# Patient Record
Sex: FEMALE | Race: BLACK OR AFRICAN AMERICAN | Employment: OTHER | URBAN - METROPOLITAN AREA
[De-identification: names, ages, dates, MRNs, and addresses within clinical notes are randomized per-mention and may not be internally consistent; named-entity substitution may affect disease eponyms.]

---

## 2017-07-12 ENCOUNTER — OFFICE VISIT (OUTPATIENT)
Dept: SURGERY | Age: 62
End: 2017-07-12

## 2017-07-12 VITALS
WEIGHT: 150.8 LBS | HEART RATE: 77 BPM | BODY MASS INDEX: 28.47 KG/M2 | HEIGHT: 61 IN | SYSTOLIC BLOOD PRESSURE: 120 MMHG | DIASTOLIC BLOOD PRESSURE: 66 MMHG | TEMPERATURE: 97.3 F

## 2017-07-12 DIAGNOSIS — Z90.711 HISTORY OF HYSTERECTOMY, SUPRACERVICAL: ICD-10-CM

## 2017-07-12 DIAGNOSIS — Z12.31 ENCOUNTER FOR SCREENING MAMMOGRAM FOR BREAST CANCER: ICD-10-CM

## 2017-07-12 DIAGNOSIS — Z01.419 ENCOUNTER FOR ROUTINE GYNECOLOGICAL EXAMINATION WITH PAPANICOLAOU SMEAR OF CERVIX: Primary | ICD-10-CM

## 2017-07-12 DIAGNOSIS — N95.1 SYMPTOMATIC MENOPAUSAL OR FEMALE CLIMACTERIC STATES: ICD-10-CM

## 2017-07-12 NOTE — MR AVS SNAPSHOT
Visit Information Date & Time Provider Department Dept. Phone Encounter #  
 7/12/2017  3:30 PM Vani Su, [address] [facility] Surgical [address] [id number] Follow-up Instructions Return in about 1 year (around 7/12/2018), or if symptoms worsen or fail to improve. Upcoming Health Maintenance Date Due Hepatitis C Screening 1955 DTaP/Tdap/Td series (1 - Tdap) 10/20/1978 FOBT Q 1 YEAR AGE 50-75 10/20/2007 PAP AKA CERVICAL CYTOLOGY 7/3/2017 INFLUENZA AGE 9 TO ADULT 8/1/2017 BREAST CANCER SCRN MAMMOGRAM 12/5/2018 Allergies as of 7/12/2017  Review Complete On: 7/12/2017 By: Vani Su MD  
 No Known Allergies Current Immunizations  Never Reviewed No immunizations on file. Not reviewed this visit You Were Diagnosed With   
  
 Codes Comments Encounter for routine gynecological examination with Papanicolaou smear of cervix    -  Primary ICD-10-CM: M72.002 ICD-9-CM: V72.31, V76.2 History of hysterectomy, supracervical     ICD-10-CM: Z90.711 ICD-9-CM: V88.02 Symptomatic menopausal or female climacteric states     ICD-10-CM: N95.1 ICD-9-CM: 627.2 Encounter for screening mammogram for breast cancer     ICD-10-CM: Z12.31 
ICD-9-CM: V76.12 Vitals BP Pulse Temp Height(growth percentile) Weight(growth percentile) LMP  
 120/66 77 97.3 °F (36.3 °C) (Oral) 5' 1\" (1.549 m) 150 lb 12.8 oz (68.4 kg) 01/12/2004 BMI OB Status Smoking Status 28.49 kg/m2 Hysterectomy Never Smoker Vitals History BMI and BSA Data Body Mass Index Body Surface Area  
 28.49 kg/m 2 1.72 m 2 Preferred Pharmacy Pharmacy Name Phone [facility] [address] , [address] [facility] [phone] Your Updated Medication List  
  
   
This list is accurate as of: 7/12/17  4:10 PM.  Always use your most recent med list. amLODIPine 5 mg tablet Commonly known as:  Jacque Pineda Take 5 mg by mouth daily. atorvastatin 10 mg tablet Commonly known as:  LIPITOR Take 1 Tab by mouth daily. SARA ASPIRIN PO Take  by mouth.  
  
 multivitamin tablet Commonly known as:  ONE A DAY Take 1 Tab by mouth daily. We Performed the Following PAP, LB, RFX HPV SELBW([id number]) [id number] CPT(R)] Follow-up Instructions Return in about 1 year (around 7/12/2018), or if symptoms worsen or fail to improve. To-Do List   
 07/12/2017 Imaging:  BOB MAMMO BI SCREENING INCL CAD Introducing \[facility]\"" & HEALTH SERVICES! Dear Barron Jenkins: Thank you for requesting a remocean account. Our records indicate that you already have an active remocean account. You can access your account anytime at [email or web address] Did you know that you can access your hospital and ER discharge instructions at any time in remocean? You can also review all of your test results from your hospital stay or ER visit. Additional Information If you have questions, please visit the Frequently Asked Questions section of the remocean website at [email or web address] Remember, remocean is NOT to be used for urgent needs. For medical emergencies, dial 911. Now available from your iPhone and Android! Please provide this summary of care documentation to your next provider. Your primary care clinician is listed as Darnell Cotton. If you have any questions after today's visit, please call [phone].

## 2017-07-12 NOTE — PROGRESS NOTES
SUBJECTIVE: Mac Gallardo is a 63 y.o. female who presents with desire for annual well woman exam. Patient's last menstrual period was 2004. No Known Allergies      Past Medical History:   Diagnosis Date    Anemia NEC     Essential hypertension     Sickle cell trait (HCC)        Past Surgical History:   Procedure Laterality Date    HX  SECTION      x1    HX DILATION AND CURETTAGE      HX HYSTERECTOMY  2004    [facility]    HX TUBAL LIGATION         OB History      Para Term  AB Living    3 2   1     SAB TAB Ectopic Molar Multiple Live Births                   Family History   Problem Relation Age of Onset    Diabetes Mother     Hypertension Mother     Elevated Lipids Mother     Heart Disease Father 79     CHF    Hypertension Father     Psychiatric Disorder Father      Alzheimer's dz    Hypertension Sister     Hypertension Sister        Social History     Social History    Marital status:      Spouse name: N/A    Number of children: N/A    Years of education: N/A     Occupational History    Not on file. Social History Main Topics    Smoking status: Never Smoker    Smokeless tobacco: Never Used    Alcohol use Yes      Comment: rarely    Drug use: No    Sexual activity: Yes     Partners: Male     Birth control/ protection: Surgical     Other Topics Concern    Not on file     Social History Narrative       Current Outpatient Prescriptions   Medication Sig Dispense Refill    multivitamin (ONE A DAY) tablet Take 1 Tab by mouth daily.  SARA ASPIRIN PO Take  by mouth.  atorvastatin (LIPITOR) 10 mg tablet Take 1 Tab by mouth daily. 90 Tab 3    amLODIPine (NORVASC) 5 mg tablet Take 5 mg by mouth daily. Review of Systems:   Constitutional: No weight change, chills or fever, anorexia, weakness or sleep disturbance . Cardiovascular: No chest pain, shortness of breath, or palpitations .   Respiratory: No cough, shortness of breath, hemoptysis, or orthopnea . Neurologic: No syncope, headaches or seizures . Hematologic: No easy bruising or unusual bleeding . Psychiatric: No insomnia, confusion, depression, or anxiety . GI:No nausea and vomiting, diarrhea or constipation  . : See HPI . Musculoskeletal: No joint pain or muscle pain . Endocrine: No polydipsia, polyuria, cold intolerance, excessive fatigue, or sleep disturbance . Integumentary: No breast pain, lumps, nipple discharge, or axillary lumps . Objective:     Visit Vitals    Ht 5' 1\" (1.549 m)    Wt 150 lb 12.8 oz (68.4 kg)    LMP 01/12/2004    BMI 28.49 kg/m2       General:  alert, cooperative, no distress, appears stated age   Skin:  no rash or abnormalities   Eyes: negative   Mouth: MMM no lesions   Lymph Nodes:  Cervical, supraclavicular, and axillary nodes normal.   Breast Exam: normal appearance, no masses or tenderness    Lungs:  clear to auscultation bilaterally   Heart:  regular rate and rhythm, S1, S2 normal, no murmur, click, rub or gallop   Abdomen: soft, non-tender. Bowel sounds normal. No masses,  no organomegaly   Back:  Costovertebral angle tenderness absent   Genitourinary: External genitalia: normal general appearance  Urinary system: urethral meatus normal  Vaginal: normal mucosa without prolapse or lesions  Cervix: normal appearance  Adnexa: normal bimanual exam and non palpable  Uterus: removed surgically. Extremities:  extremities normal, atraumatic, no cyanosis or edema   Neurologic:  sensation grossly intact. Psychiatric:  non focal     ASSESSMENT:      ICD-10-CM ICD-9-CM    1. Encounter for routine gynecological examination with Papanicolaou smear of cervix Z01.419 V72.31 PAP, LB, RFX HPV [facility]([id number])     V76.2    2. History of hysterectomy, supracervical Z90.711 V88.02    3. Symptomatic menopausal or female climacteric states N95.1 627.2    4.  Encounter for screening mammogram for breast cancer Z12.31 V76.12 BOB MAMMO BI SCREENING INCL CAD        Follow-up Disposition:  Return in about 1 year (around 7/12/2018), or if symptoms worsen or fail to improve.

## 2017-07-14 LAB
CYTOLOGIST CVX/VAG CYTO: NORMAL
DX ICD CODE: NORMAL
LABCORP, 190119: NORMAL
Lab: NORMAL
Lab: NORMAL
OTHER STN SPEC: NORMAL
PATH REPORT.FINAL DX SPEC: NORMAL
STAT OF ADQ CVX/VAG CYTO-IMP: NORMAL

## 2017-12-08 ENCOUNTER — HOSPITAL ENCOUNTER (OUTPATIENT)
Dept: MAMMOGRAPHY | Age: 62
Discharge: HOME | End: 2017-12-08
Attending: OBSTETRICS & GYNECOLOGY
Payer: COMMERCIAL

## 2017-12-08 DIAGNOSIS — Z12.31 ENCOUNTER FOR SCREENING MAMMOGRAM FOR BREAST CANCER: ICD-10-CM

## 2017-12-08 PROCEDURE — 77067 SCR MAMMO BI INCL CAD: CPT

## 2018-07-13 ENCOUNTER — OFFICE VISIT (OUTPATIENT)
Dept: SURGERY | Age: 63
End: 2018-07-13

## 2018-07-13 VITALS
HEART RATE: 75 BPM | DIASTOLIC BLOOD PRESSURE: 68 MMHG | OXYGEN SATURATION: 98 % | HEIGHT: 61 IN | TEMPERATURE: 96.8 F | SYSTOLIC BLOOD PRESSURE: 130 MMHG | WEIGHT: 151 LBS | BODY MASS INDEX: 28.51 KG/M2

## 2018-07-13 DIAGNOSIS — N95.1 SYMPTOMATIC MENOPAUSAL OR FEMALE CLIMACTERIC STATES: ICD-10-CM

## 2018-07-13 DIAGNOSIS — Z01.419 WOMEN'S ANNUAL ROUTINE GYNECOLOGICAL EXAMINATION: Primary | ICD-10-CM

## 2018-07-13 DIAGNOSIS — Z90.711 HISTORY OF HYSTERECTOMY, SUPRACERVICAL: ICD-10-CM

## 2018-07-13 NOTE — PROGRESS NOTES
SUBJECTIVE: Aamir Pérez is a 63 y.o. female who presents with desire for annual well woman exam. Patient's last menstrual period was 2004. No Known Allergies      Past Medical History:   Diagnosis Date    Anemia NEC     Essential hypertension     Sickle cell trait (HCC)        Past Surgical History:   Procedure Laterality Date    HX  SECTION      x1    HX DILATION AND CURETTAGE      HX HYSTERECTOMY  2004    [address] [facility] [address]    HX TUBAL LIGATION         OB History      Para Term  AB Living    3 2   1     SAB TAB Ectopic Molar Multiple Live Births                   Family History   Problem Relation Age of Onset    Diabetes Mother     Hypertension Mother     Elevated Lipids Mother     Heart Disease Father 79     CHF    Hypertension Father     Psychiatric Disorder Father      Alzheimer's dz    Hypertension Sister     Hypertension Sister        Social History     Social History    Marital status:      Spouse name: N/A    Number of children: N/A    Years of education: N/A     Occupational History    Not on file. Social History Main Topics    Smoking status: Never Smoker    Smokeless tobacco: Never Used    Alcohol use Yes      Comment: rarely    Drug use: No    Sexual activity: Yes     Partners: Male     Birth control/ protection: Surgical     Other Topics Concern    Not on file     Social History Narrative       Current Outpatient Prescriptions   Medication Sig Dispense Refill    multivitamin (ONE A DAY) tablet Take 1 Tab by mouth daily.  SARA ASPIRIN PO Take  by mouth.  atorvastatin (LIPITOR) 10 mg tablet Take 1 Tab by mouth daily. 90 Tab 3    amLODIPine (NORVASC) 5 mg tablet Take 5 mg by mouth daily. Review of Systems:   Constitutional: No weight change, chills or fever, anorexia, weakness or sleep disturbance . Cardiovascular: No chest pain, shortness of breath, or palpitations .   Respiratory: No cough, shortness of breath, hemoptysis, or orthopnea . Neurologic: No syncope, headaches or seizures . Hematologic: No easy bruising or unusual bleeding . Psychiatric: No insomnia, confusion, depression, or anxiety . GI:No nausea and vomiting, diarrhea or constipation  . : See HPI . Musculoskeletal: No joint pain or muscle pain . Endocrine: No polydipsia, polyuria, cold intolerance, excessive fatigue, or sleep disturbance . Integumentary: No breast pain, lumps, nipple discharge, or axillary lumps . Objective:     Visit Vitals    /68    Pulse 75    Temp 96.8 °F (36 °C) (Temporal)    Ht 5' 1\" (1.549 m)    Wt 151 lb (68.5 kg)    LMP 01/12/2004    SpO2 98%    BMI 28.53 kg/m2       General:  alert, cooperative, no distress, appears stated age   Skin:  no rash or abnormalities   Eyes: negative   Mouth: MMM no lesions   Lymph Nodes:  Cervical, supraclavicular, and axillary nodes normal.   Breast Exam: normal appearance, no masses or tenderness    Lungs:  clear to auscultation bilaterally   Heart:  regular rate and rhythm, S1, S2 normal, no murmur, click, rub or gallop   Abdomen: soft, non-tender. Bowel sounds normal. No masses,  no organomegaly   Back:  Costovertebral angle tenderness absent   Genitourinary: External genitalia: normal general appearance  Urinary system: urethral meatus normal  Vaginal: normal mucosa without prolapse or lesions  Cervix: normal appearance  Adnexa: normal bimanual exam and non palpable  Uterus: removed surgically. Extremities:  extremities normal, atraumatic, no cyanosis or edema   Neurologic:  sensation grossly intact. Psychiatric:  non focal     ASSESSMENT:      ICD-10-CM ICD-9-CM    1. Women's annual routine gynecological examination Z01.419 V72.31    2. History of hysterectomy, supracervical Z90.711 V88.02    3.  Symptomatic menopausal or female climacteric states N95.1 627.2         Follow-up Disposition:  Return in about 1 year (around 7/13/2019), or if symptoms worsen or fail to improve.

## 2018-07-13 NOTE — MR AVS SNAPSHOT
[address] [address] [phone] Patient: Avery Rick MRN: [id number] [id number] Visit Information Date & Time Provider Department Dept. Phone Encounter #  
 7/13/2018  2:15 PM Leonila Mata [facility] Surgical [address] [id number] Follow-up Instructions Return in about 1 year (around 7/13/2019), or if symptoms worsen or fail to improve. Upcoming Health Maintenance Date Due Hepatitis C Screening 1955 DTaP/Tdap/Td series (1 - Tdap) 10/20/1978 FOBT Q 1 YEAR AGE 50-75 10/20/2007 ZOSTER VACCINE AGE 60> 8/20/2017 Influenza Age 5 to Adult 8/1/2018 BREAST CANCER SCRN MAMMOGRAM 12/8/2019 PAP AKA CERVICAL CYTOLOGY 7/12/2020 Allergies as of 7/13/2018  Review Complete On: 7/13/2018 By: Leonila Mata MD  
 No Known Allergies Current Immunizations  Never Reviewed No immunizations on file. Not reviewed this visit You Were Diagnosed With   
  
 Codes Comments Women's annual routine gynecological examination    -  Primary ICD-10-CM: H54.643 ICD-9-CM: V72.31 History of hysterectomy, supracervical     ICD-10-CM: Z90.711 ICD-9-CM: V88.02 Symptomatic menopausal or female climacteric states     ICD-10-CM: N95.1 ICD-9-CM: 627.2 Vitals BP Pulse Temp Height(growth percentile) Weight(growth percentile) LMP  
 130/68 75 96.8 °F (36 °C) (Temporal) 5' 1\" (1.549 m) 151 lb (68.5 kg) 01/12/2004 SpO2 BMI OB Status Smoking Status 98% 28.53 kg/m2 Hysterectomy Never Smoker Vitals History BMI and BSA Data Body Mass Index Body Surface Area 28.53 kg/m 2 1.72 m 2 Preferred Pharmacy Pharmacy Name Phone [facility] [address] [place] [address], [address] [place] [address] [phone] Your Updated Medication List  
  
   
This list is accurate as of 7/13/18  3:13 PM.  Always use your most recent med list.  
  
 amLODIPine 5 mg tablet Commonly known as:  Flores Del Take 5 mg by mouth daily. atorvastatin 10 mg tablet Commonly known as:  LIPITOR Take 1 Tab by mouth daily. SARA ASPIRIN PO Take  by mouth.  
  
 multivitamin tablet Commonly known as:  ONE A DAY Take 1 Tab by mouth daily. Follow-up Instructions Return in about 1 year (around 7/13/2019), or if symptoms worsen or fail to improve. Introducing [facility]! Dear Kerline Croft: Thank you for requesting a [email or web address] account. Our records indicate that you already have an active [email or web address] account. You can access your account anytime at [email or web address] Did you know that you can access your hospital and ER discharge instructions at any time in [email or web address]? You can also review all of your test results from your hospital stay or ER visit. Additional Information If you have questions, please visit the Frequently Asked Questions section of the [email or web address] website at [email or web address] Remember, [email or web address] is NOT to be used for urgent needs. For medical emergencies, dial 911. Now available from your iPhone and Android! Please provide this summary of care documentation to your next provider. Your primary care clinician is listed as Joby Marrero. If you have any questions after today's visit, please call [phone].

## 2018-12-10 ENCOUNTER — HOSPITAL ENCOUNTER (OUTPATIENT)
Dept: MAMMOGRAPHY | Age: 63
Discharge: HOME | End: 2018-12-10
Attending: INTERNAL MEDICINE
Payer: COMMERCIAL

## 2018-12-10 DIAGNOSIS — Z12.39 SCREENING BREAST EXAMINATION: ICD-10-CM

## 2018-12-10 PROCEDURE — 77067 SCR MAMMO BI INCL CAD: CPT

## 2020-01-02 ENCOUNTER — HOSPITAL ENCOUNTER (OUTPATIENT)
Dept: MAMMOGRAPHY | Age: 65
Discharge: HOME | End: 2020-01-02
Attending: INTERNAL MEDICINE
Payer: COMMERCIAL

## 2020-01-02 DIAGNOSIS — Z12.31 VISIT FOR SCREENING MAMMOGRAM: ICD-10-CM

## 2020-01-02 PROCEDURE — 77067 SCR MAMMO BI INCL CAD: CPT

## 2020-12-09 ENCOUNTER — TRANSCRIBE ORDER (OUTPATIENT)
Dept: SCHEDULING | Age: 65
End: 2020-12-09

## 2020-12-09 DIAGNOSIS — Z12.31 VISIT FOR SCREENING MAMMOGRAM: Primary | ICD-10-CM

## 2021-02-04 ENCOUNTER — HOSPITAL ENCOUNTER (OUTPATIENT)
Dept: MAMMOGRAPHY | Age: 66
Discharge: HOME | End: 2021-02-04
Attending: INTERNAL MEDICINE
Payer: COMMERCIAL

## 2021-02-04 DIAGNOSIS — Z12.31 VISIT FOR SCREENING MAMMOGRAM: ICD-10-CM

## 2021-02-04 PROCEDURE — 77063 BREAST TOMOSYNTHESIS BI: CPT

## 2022-01-12 ENCOUNTER — TRANSCRIBE ORDER (OUTPATIENT)
Dept: SCHEDULING | Age: 67
End: 2022-01-12

## 2022-01-12 DIAGNOSIS — Z12.31 SCREENING MAMMOGRAM FOR HIGH-RISK PATIENT: Primary | ICD-10-CM

## 2023-05-10 RX ORDER — AMLODIPINE BESYLATE 5 MG/1
5 TABLET ORAL DAILY
COMMUNITY
Start: 2015-01-14

## 2023-05-10 RX ORDER — ATORVASTATIN CALCIUM 10 MG/1
TABLET, FILM COATED ORAL DAILY
COMMUNITY
Start: 2015-04-24

## 2024-09-24 ENCOUNTER — APPOINTMENT (OUTPATIENT)
Facility: HOSPITAL | Age: 69
End: 2024-09-24
Payer: MEDICARE

## 2024-09-24 ENCOUNTER — APPOINTMENT (OUTPATIENT)
Facility: HOSPITAL | Age: 69
DRG: 494 | End: 2024-09-24
Payer: MEDICARE

## 2024-09-24 ENCOUNTER — HOSPITAL ENCOUNTER (EMERGENCY)
Facility: HOSPITAL | Age: 69
Discharge: ANOTHER HOSPITAL | End: 2024-09-24
Payer: MEDICARE

## 2024-09-24 ENCOUNTER — HOSPITAL ENCOUNTER (INPATIENT)
Facility: HOSPITAL | Age: 69
LOS: 5 days | Discharge: HOME | DRG: 494 | End: 2024-09-29
Attending: EMERGENCY MEDICINE | Admitting: ORTHOPAEDIC SURGERY
Payer: MEDICARE

## 2024-09-24 VITALS
TEMPERATURE: 97.9 F | RESPIRATION RATE: 18 BRPM | WEIGHT: 150 LBS | HEART RATE: 59 BPM | HEIGHT: 61 IN | BODY MASS INDEX: 28.32 KG/M2 | OXYGEN SATURATION: 95 % | DIASTOLIC BLOOD PRESSURE: 58 MMHG | SYSTOLIC BLOOD PRESSURE: 123 MMHG

## 2024-09-24 DIAGNOSIS — S82.892A BILATERAL ANKLE FRACTURES, CLOSED, INITIAL ENCOUNTER: Primary | ICD-10-CM
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
## 2024-09-24 DIAGNOSIS — S82.841A BIMALLEOLAR FRACTURE, RIGHT, CLOSED, INITIAL ENCOUNTER: Primary | ICD-10-CM

## 2024-09-24 DIAGNOSIS — S82.891A CLOSED FRACTURE OF BOTH ANKLES, INITIAL ENCOUNTER: ICD-10-CM

## 2024-09-24 DIAGNOSIS — S82.52XA CLOSED DISPLACED FRACTURE OF MEDIAL MALLEOLUS OF LEFT TIBIA, INITIAL ENCOUNTER: ICD-10-CM

## 2024-09-24 DIAGNOSIS — S82.892A CLOSED FRACTURE OF BOTH ANKLES, INITIAL ENCOUNTER: ICD-10-CM

## 2024-09-24 DIAGNOSIS — S82.891A BILATERAL ANKLE FRACTURES, CLOSED, INITIAL ENCOUNTER: Primary | ICD-10-CM

## 2024-09-24 LAB
ALBUMIN SERPL-MCNC: 3.7 G/DL (ref 3.5–5)
ALBUMIN/GLOB SERPL: 0.9 (ref 1.1–2.2)
ALP SERPL-CCNC: 90 U/L (ref 45–117)
ALT SERPL-CCNC: 16 U/L (ref 12–78)
ANION GAP SERPL CALC-SCNC: 9 MMOL/L (ref 2–12)
AST SERPL-CCNC: 18 U/L (ref 15–37)
BASOPHILS # BLD: 0.1 K/UL (ref 0–0.1)
BASOPHILS NFR BLD: 1 % (ref 0–1)
BILIRUB SERPL-MCNC: 0.2 MG/DL (ref 0.2–1)
BUN SERPL-MCNC: 20 MG/DL (ref 6–20)
BUN/CREAT SERPL: 15 (ref 12–20)
CALCIUM SERPL-MCNC: 9.8 MG/DL (ref 8.5–10.1)
CHLORIDE SERPL-SCNC: 104 MMOL/L (ref 97–108)
CO2 SERPL-SCNC: 30 MMOL/L (ref 21–32)
CREAT SERPL-MCNC: 1.31 MG/DL (ref 0.55–1.02)
DIFFERENTIAL METHOD BLD: ABNORMAL
EOSINOPHIL # BLD: 0.1 K/UL (ref 0–0.4)
EOSINOPHIL NFR BLD: 1 % (ref 0–7)
ERYTHROCYTE [DISTWIDTH] IN BLOOD BY AUTOMATED COUNT: 13.9 % (ref 11.5–14.5)
GLOBULIN SER CALC-MCNC: 4.2 G/DL (ref 2–4)
GLUCOSE SERPL-MCNC: 123 MG/DL (ref 65–100)
HCT VFR BLD AUTO: 36.3 % (ref 35–47)
HGB BLD-MCNC: 12.2 G/DL (ref 11.5–16)
IMM GRANULOCYTES # BLD AUTO: 0.1 K/UL (ref 0–0.04)
IMM GRANULOCYTES NFR BLD AUTO: 1 % (ref 0–0.5)
LYMPHOCYTES # BLD: 2.8 K/UL (ref 0.8–3.5)
LYMPHOCYTES NFR BLD: 19 % (ref 12–49)
MCH RBC QN AUTO: 25.3 PG (ref 26–34)
MCHC RBC AUTO-ENTMCNC: 33.6 G/DL (ref 30–36.5)
MCV RBC AUTO: 75.3 FL (ref 80–99)
MONOCYTES # BLD: 0.8 K/UL (ref 0–1)
MONOCYTES NFR BLD: 5 % (ref 5–13)
NEUTS SEG # BLD: 10.5 K/UL (ref 1.8–8)
NEUTS SEG NFR BLD: 73 % (ref 32–75)
NRBC # BLD: 0 K/UL (ref 0–0.01)
NRBC BLD-RTO: 0 PER 100 WBC
PLATELET # BLD AUTO: 280 K/UL (ref 150–400)
PMV BLD AUTO: 11.3 FL (ref 8.9–12.9)
POTASSIUM SERPL-SCNC: 4 MMOL/L (ref 3.5–5.1)
PROT SERPL-MCNC: 7.9 G/DL (ref 6.4–8.2)
RBC # BLD AUTO: 4.82 M/UL (ref 3.8–5.2)
SODIUM SERPL-SCNC: 143 MMOL/L (ref 136–145)
TROPONIN I SERPL HS-MCNC: 17 NG/L (ref 0–51)
TROPONIN I SERPL HS-MCNC: 18 NG/L (ref 0–51)
WBC # BLD AUTO: 14.3 K/UL (ref 3.6–11)

## 2024-09-24 PROCEDURE — 85025 COMPLETE CBC W/AUTO DIFF WBC: CPT

## 2024-09-24 PROCEDURE — 73610 X-RAY EXAM OF ANKLE: CPT

## 2024-09-24 PROCEDURE — 6360000002 HC RX W HCPCS: Performed by: PHYSICIAN ASSISTANT

## 2024-09-24 PROCEDURE — 80053 COMPREHEN METABOLIC PANEL: CPT

## 2024-09-24 PROCEDURE — 36415 COLL VENOUS BLD VENIPUNCTURE: CPT

## 2024-09-24 PROCEDURE — 84484 ASSAY OF TROPONIN QUANT: CPT

## 2024-09-24 PROCEDURE — 2580000003 HC RX 258: Performed by: PHYSICIAN ASSISTANT

## 2024-09-24 PROCEDURE — 1100000000 HC RM PRIVATE

## 2024-09-24 PROCEDURE — 6370000000 HC RX 637 (ALT 250 FOR IP): Performed by: PHYSICIAN ASSISTANT

## 2024-09-24 PROCEDURE — 99285 EMERGENCY DEPT VISIT HI MDM: CPT

## 2024-09-24 PROCEDURE — 93005 ELECTROCARDIOGRAM TRACING: CPT | Performed by: PHYSICIAN ASSISTANT

## 2024-09-24 PROCEDURE — 96374 THER/PROPH/DIAG INJ IV PUSH: CPT

## 2024-09-24 PROCEDURE — 71045 X-RAY EXAM CHEST 1 VIEW: CPT

## 2024-09-24 PROCEDURE — APPNB15 APP NON BILLABLE TIME 0-15 MINS: Performed by: PHYSICIAN ASSISTANT

## 2024-09-24 PROCEDURE — 2580000003 HC RX 258

## 2024-09-24 PROCEDURE — 6370000000 HC RX 637 (ALT 250 FOR IP)

## 2024-09-24 RX ORDER — POTASSIUM CHLORIDE 7.45 MG/ML
10 INJECTION INTRAVENOUS PRN
Status: DISCONTINUED | OUTPATIENT
Start: 2024-09-24 | End: 2024-09-25

## 2024-09-24 RX ORDER — ACETAMINOPHEN 500 MG
500 TABLET ORAL EVERY 6 HOURS
Status: DISCONTINUED | OUTPATIENT
Start: 2024-09-24 | End: 2024-09-29 | Stop reason: HOSPADM

## 2024-09-24 RX ORDER — OXYCODONE HYDROCHLORIDE 5 MG/1
5 TABLET ORAL EVERY 4 HOURS PRN
Status: DISCONTINUED | OUTPATIENT
Start: 2024-09-24 | End: 2024-09-29 | Stop reason: HOSPADM

## 2024-09-24 RX ORDER — SODIUM CHLORIDE 9 MG/ML
INJECTION, SOLUTION INTRAVENOUS CONTINUOUS
Status: DISCONTINUED | OUTPATIENT
Start: 2024-09-24 | End: 2024-09-26

## 2024-09-24 RX ORDER — POLYETHYLENE GLYCOL 3350 17 G/17G
17 POWDER, FOR SOLUTION ORAL DAILY PRN
Status: DISCONTINUED | OUTPATIENT
Start: 2024-09-24 | End: 2024-09-29 | Stop reason: HOSPADM

## 2024-09-24 RX ORDER — OXYCODONE HYDROCHLORIDE 5 MG/1
5 TABLET ORAL
Status: COMPLETED | OUTPATIENT
Start: 2024-09-24 | End: 2024-09-24

## 2024-09-24 RX ORDER — POTASSIUM CHLORIDE 1500 MG/1
40 TABLET, EXTENDED RELEASE ORAL PRN
Status: DISCONTINUED | OUTPATIENT
Start: 2024-09-24 | End: 2024-09-25

## 2024-09-24 RX ORDER — 0.9 % SODIUM CHLORIDE 0.9 %
1000 INTRAVENOUS SOLUTION INTRAVENOUS ONCE
Status: COMPLETED | OUTPATIENT
Start: 2024-09-24 | End: 2024-09-24

## 2024-09-24 RX ORDER — INSULIN LISPRO 100 [IU]/ML
0-4 INJECTION, SOLUTION INTRAVENOUS; SUBCUTANEOUS NIGHTLY
Status: DISCONTINUED | OUTPATIENT
Start: 2024-09-24 | End: 2024-09-25

## 2024-09-24 RX ORDER — SODIUM CHLORIDE 0.9 % (FLUSH) 0.9 %
5-40 SYRINGE (ML) INJECTION EVERY 12 HOURS SCHEDULED
Status: DISCONTINUED | OUTPATIENT
Start: 2024-09-24 | End: 2024-09-29 | Stop reason: HOSPADM

## 2024-09-24 RX ORDER — SODIUM CHLORIDE 9 MG/ML
INJECTION, SOLUTION INTRAVENOUS PRN
Status: DISCONTINUED | OUTPATIENT
Start: 2024-09-24 | End: 2024-09-29 | Stop reason: HOSPADM

## 2024-09-24 RX ORDER — INSULIN LISPRO 100 [IU]/ML
0-8 INJECTION, SOLUTION INTRAVENOUS; SUBCUTANEOUS
Status: DISCONTINUED | OUTPATIENT
Start: 2024-09-25 | End: 2024-09-25

## 2024-09-24 RX ORDER — MAGNESIUM SULFATE IN WATER 40 MG/ML
2000 INJECTION, SOLUTION INTRAVENOUS PRN
Status: DISCONTINUED | OUTPATIENT
Start: 2024-09-24 | End: 2024-09-29 | Stop reason: HOSPADM

## 2024-09-24 RX ORDER — ONDANSETRON 2 MG/ML
4 INJECTION INTRAMUSCULAR; INTRAVENOUS EVERY 6 HOURS PRN
Status: DISCONTINUED | OUTPATIENT
Start: 2024-09-24 | End: 2024-09-29 | Stop reason: HOSPADM

## 2024-09-24 RX ORDER — MORPHINE SULFATE 2 MG/ML
2 INJECTION, SOLUTION INTRAMUSCULAR; INTRAVENOUS
Status: DISCONTINUED | OUTPATIENT
Start: 2024-09-24 | End: 2024-09-29 | Stop reason: HOSPADM

## 2024-09-24 RX ORDER — ENOXAPARIN SODIUM 100 MG/ML
30 INJECTION SUBCUTANEOUS ONCE
Status: COMPLETED | OUTPATIENT
Start: 2024-09-24 | End: 2024-09-25

## 2024-09-24 RX ORDER — ATORVASTATIN CALCIUM 20 MG/1
20 TABLET, FILM COATED ORAL DAILY
Status: DISCONTINUED | OUTPATIENT
Start: 2024-09-25 | End: 2024-09-25

## 2024-09-24 RX ORDER — VALSARTAN 160 MG/1
160 TABLET ORAL DAILY
Status: DISCONTINUED | OUTPATIENT
Start: 2024-09-25 | End: 2024-09-29 | Stop reason: HOSPADM

## 2024-09-24 RX ORDER — ONDANSETRON 4 MG/1
4 TABLET, ORALLY DISINTEGRATING ORAL EVERY 8 HOURS PRN
Status: DISCONTINUED | OUTPATIENT
Start: 2024-09-24 | End: 2024-09-29 | Stop reason: HOSPADM

## 2024-09-24 RX ORDER — FAMOTIDINE 20 MG/1
20 TABLET, FILM COATED ORAL 2 TIMES DAILY
Status: DISCONTINUED | OUTPATIENT
Start: 2024-09-24 | End: 2024-09-26

## 2024-09-24 RX ORDER — SODIUM CHLORIDE, SODIUM LACTATE, POTASSIUM CHLORIDE, CALCIUM CHLORIDE 600; 310; 30; 20 MG/100ML; MG/100ML; MG/100ML; MG/100ML
INJECTION, SOLUTION INTRAVENOUS ONCE
Status: DISCONTINUED | OUTPATIENT
Start: 2024-09-24 | End: 2024-09-29 | Stop reason: HOSPADM

## 2024-09-24 RX ORDER — SODIUM CHLORIDE 0.9 % (FLUSH) 0.9 %
5-40 SYRINGE (ML) INJECTION PRN
Status: DISCONTINUED | OUTPATIENT
Start: 2024-09-24 | End: 2024-09-29 | Stop reason: HOSPADM

## 2024-09-24 RX ADMIN — OXYCODONE HYDROCHLORIDE 5 MG: 5 TABLET ORAL at 16:49

## 2024-09-24 RX ADMIN — SODIUM CHLORIDE, PRESERVATIVE FREE 5 ML: 5 INJECTION INTRAVENOUS at 21:31

## 2024-09-24 RX ADMIN — SODIUM CHLORIDE 1000 ML: 9 INJECTION, SOLUTION INTRAVENOUS at 17:24

## 2024-09-24 RX ADMIN — ACETAMINOPHEN 500 MG: 500 TABLET ORAL at 21:25

## 2024-09-24 RX ADMIN — MORPHINE SULFATE 2 MG: 2 INJECTION, SOLUTION INTRAMUSCULAR; INTRAVENOUS at 21:25

## 2024-09-24 ASSESSMENT — PAIN - FUNCTIONAL ASSESSMENT
PAIN_FUNCTIONAL_ASSESSMENT: 0-10
PAIN_FUNCTIONAL_ASSESSMENT: 0-10

## 2024-09-24 ASSESSMENT — PAIN DESCRIPTION - ORIENTATION
ORIENTATION: LEFT;RIGHT
ORIENTATION: RIGHT;LEFT

## 2024-09-24 ASSESSMENT — PAIN DESCRIPTION - LOCATION
LOCATION: ANKLE

## 2024-09-24 ASSESSMENT — PAIN SCALES - GENERAL
PAINLEVEL_OUTOF10: 6
PAINLEVEL_OUTOF10: 6
PAINLEVEL_OUTOF10: 7

## 2024-09-25 ENCOUNTER — ANESTHESIA EVENT (OUTPATIENT)
Facility: HOSPITAL | Age: 69
End: 2024-09-25
Payer: MEDICARE

## 2024-09-25 ENCOUNTER — APPOINTMENT (OUTPATIENT)
Facility: HOSPITAL | Age: 69
DRG: 494 | End: 2024-09-25
Payer: MEDICARE

## 2024-09-25 ENCOUNTER — ANESTHESIA (OUTPATIENT)
Facility: HOSPITAL | Age: 69
End: 2024-09-25
Payer: MEDICARE

## 2024-09-25 LAB
ABO + RH BLD: NORMAL
ALBUMIN SERPL-MCNC: 3.3 G/DL (ref 3.5–5)
ALBUMIN/GLOB SERPL: 0.8 (ref 1.1–2.2)
ALP SERPL-CCNC: 84 U/L (ref 45–117)
ALT SERPL-CCNC: 16 U/L (ref 12–78)
ANION GAP SERPL CALC-SCNC: 3 MMOL/L (ref 2–12)
AST SERPL-CCNC: 16 U/L (ref 15–37)
BASOPHILS # BLD: 0 K/UL (ref 0–0.1)
BASOPHILS NFR BLD: 0 % (ref 0–1)
BILIRUB SERPL-MCNC: 0.5 MG/DL (ref 0.2–1)
BLOOD GROUP ANTIBODIES SERPL: NORMAL
BUN SERPL-MCNC: 18 MG/DL (ref 6–20)
BUN/CREAT SERPL: 17 (ref 12–20)
CALCIUM SERPL-MCNC: 9.2 MG/DL (ref 8.5–10.1)
CHLORIDE SERPL-SCNC: 109 MMOL/L (ref 97–108)
CO2 SERPL-SCNC: 28 MMOL/L (ref 21–32)
COMMENT:: NORMAL
CREAT SERPL-MCNC: 1.05 MG/DL (ref 0.55–1.02)
DIFFERENTIAL METHOD BLD: ABNORMAL
EKG ATRIAL RATE: 56 BPM
EKG ATRIAL RATE: 60 BPM
EKG DIAGNOSIS: NORMAL
EKG DIAGNOSIS: NORMAL
EKG P AXIS: 46 DEGREES
EKG P AXIS: 51 DEGREES
EKG P-R INTERVAL: 140 MS
EKG P-R INTERVAL: 144 MS
EKG Q-T INTERVAL: 418 MS
EKG Q-T INTERVAL: 422 MS
EKG QRS DURATION: 72 MS
EKG QRS DURATION: 74 MS
EKG QTC CALCULATION (BAZETT): 407 MS
EKG QTC CALCULATION (BAZETT): 418 MS
EKG R AXIS: 27 DEGREES
EKG R AXIS: 9 DEGREES
EKG T AXIS: 36 DEGREES
EKG T AXIS: 49 DEGREES
EKG VENTRICULAR RATE: 56 BPM
EKG VENTRICULAR RATE: 60 BPM
EOSINOPHIL # BLD: 0.1 K/UL (ref 0–0.4)
EOSINOPHIL NFR BLD: 1 % (ref 0–7)
ERYTHROCYTE [DISTWIDTH] IN BLOOD BY AUTOMATED COUNT: 14.1 % (ref 11.5–14.5)
GLOBULIN SER CALC-MCNC: 3.9 G/DL (ref 2–4)
GLUCOSE BLD STRIP.AUTO-MCNC: 104 MG/DL (ref 65–117)
GLUCOSE BLD STRIP.AUTO-MCNC: 175 MG/DL (ref 65–117)
GLUCOSE BLD STRIP.AUTO-MCNC: 82 MG/DL (ref 65–117)
GLUCOSE BLD STRIP.AUTO-MCNC: 89 MG/DL (ref 65–117)
GLUCOSE SERPL-MCNC: 111 MG/DL (ref 65–100)
HCT VFR BLD AUTO: 34.8 % (ref 35–47)
HGB BLD-MCNC: 11.3 G/DL (ref 11.5–16)
IMM GRANULOCYTES # BLD AUTO: 0 K/UL (ref 0–0.04)
IMM GRANULOCYTES NFR BLD AUTO: 0 % (ref 0–0.5)
LYMPHOCYTES # BLD: 2.9 K/UL (ref 0.8–3.5)
LYMPHOCYTES NFR BLD: 29 % (ref 12–49)
MCH RBC QN AUTO: 24.9 PG (ref 26–34)
MCHC RBC AUTO-ENTMCNC: 32.5 G/DL (ref 30–36.5)
MCV RBC AUTO: 76.8 FL (ref 80–99)
MONOCYTES # BLD: 0.7 K/UL (ref 0–1)
MONOCYTES NFR BLD: 7 % (ref 5–13)
NEUTS SEG # BLD: 6.3 K/UL (ref 1.8–8)
NEUTS SEG NFR BLD: 63 % (ref 32–75)
NRBC # BLD: 0 K/UL (ref 0–0.01)
NRBC BLD-RTO: 0 PER 100 WBC
PLATELET # BLD AUTO: 266 K/UL (ref 150–400)
PMV BLD AUTO: 11 FL (ref 8.9–12.9)
POTASSIUM SERPL-SCNC: 3.7 MMOL/L (ref 3.5–5.1)
PROT SERPL-MCNC: 7.2 G/DL (ref 6.4–8.2)
RBC # BLD AUTO: 4.53 M/UL (ref 3.8–5.2)
SERVICE CMNT-IMP: ABNORMAL
SERVICE CMNT-IMP: NORMAL
SODIUM SERPL-SCNC: 140 MMOL/L (ref 136–145)
SPECIMEN EXP DATE BLD: NORMAL
SPECIMEN HOLD: NORMAL
WBC # BLD AUTO: 10 K/UL (ref 3.6–11)

## 2024-09-25 PROCEDURE — 2709999900 HC NON-CHARGEABLE SUPPLY: Performed by: ORTHOPAEDIC SURGERY

## 2024-09-25 PROCEDURE — 85025 COMPLETE CBC W/AUTO DIFF WBC: CPT

## 2024-09-25 PROCEDURE — 36415 COLL VENOUS BLD VENIPUNCTURE: CPT

## 2024-09-25 PROCEDURE — 6360000002 HC RX W HCPCS: Performed by: STUDENT IN AN ORGANIZED HEALTH CARE EDUCATION/TRAINING PROGRAM

## 2024-09-25 PROCEDURE — 2580000003 HC RX 258: Performed by: NURSE ANESTHETIST, CERTIFIED REGISTERED

## 2024-09-25 PROCEDURE — 83036 HEMOGLOBIN GLYCOSYLATED A1C: CPT

## 2024-09-25 PROCEDURE — C1769 GUIDE WIRE: HCPCS | Performed by: ORTHOPAEDIC SURGERY

## 2024-09-25 PROCEDURE — 3600000014 HC SURGERY LEVEL 4 ADDTL 15MIN: Performed by: ORTHOPAEDIC SURGERY

## 2024-09-25 PROCEDURE — 0QSG04Z REPOSITION RIGHT TIBIA WITH INTERNAL FIXATION DEVICE, OPEN APPROACH: ICD-10-PCS | Performed by: ORTHOPAEDIC SURGERY

## 2024-09-25 PROCEDURE — C1713 ANCHOR/SCREW BN/BN,TIS/BN: HCPCS | Performed by: ORTHOPAEDIC SURGERY

## 2024-09-25 PROCEDURE — 2720000010 HC SURG SUPPLY STERILE: Performed by: ORTHOPAEDIC SURGERY

## 2024-09-25 PROCEDURE — 86901 BLOOD TYPING SEROLOGIC RH(D): CPT

## 2024-09-25 PROCEDURE — 86900 BLOOD TYPING SEROLOGIC ABO: CPT

## 2024-09-25 PROCEDURE — 6370000000 HC RX 637 (ALT 250 FOR IP): Performed by: ORTHOPAEDIC SURGERY

## 2024-09-25 PROCEDURE — 2580000003 HC RX 258: Performed by: PHYSICIAN ASSISTANT

## 2024-09-25 PROCEDURE — 82962 GLUCOSE BLOOD TEST: CPT

## 2024-09-25 PROCEDURE — 1100000000 HC RM PRIVATE

## 2024-09-25 PROCEDURE — 6360000002 HC RX W HCPCS: Performed by: PHYSICIAN ASSISTANT

## 2024-09-25 PROCEDURE — 3600000004 HC SURGERY LEVEL 4 BASE: Performed by: ORTHOPAEDIC SURGERY

## 2024-09-25 PROCEDURE — 2500000003 HC RX 250 WO HCPCS: Performed by: NURSE ANESTHETIST, CERTIFIED REGISTERED

## 2024-09-25 PROCEDURE — 7100000001 HC PACU RECOVERY - ADDTL 15 MIN: Performed by: ORTHOPAEDIC SURGERY

## 2024-09-25 PROCEDURE — 7100000000 HC PACU RECOVERY - FIRST 15 MIN: Performed by: ORTHOPAEDIC SURGERY

## 2024-09-25 PROCEDURE — 0QSJ04Z REPOSITION RIGHT FIBULA WITH INTERNAL FIXATION DEVICE, OPEN APPROACH: ICD-10-PCS | Performed by: ORTHOPAEDIC SURGERY

## 2024-09-25 PROCEDURE — 93010 ELECTROCARDIOGRAM REPORT: CPT | Performed by: SPECIALIST

## 2024-09-25 PROCEDURE — 80053 COMPREHEN METABOLIC PANEL: CPT

## 2024-09-25 PROCEDURE — 86850 RBC ANTIBODY SCREEN: CPT

## 2024-09-25 PROCEDURE — 6360000002 HC RX W HCPCS: Performed by: NURSE ANESTHETIST, CERTIFIED REGISTERED

## 2024-09-25 PROCEDURE — 64445 NJX AA&/STRD SCIATIC NRV IMG: CPT | Performed by: STUDENT IN AN ORGANIZED HEALTH CARE EDUCATION/TRAINING PROGRAM

## 2024-09-25 PROCEDURE — 6370000000 HC RX 637 (ALT 250 FOR IP): Performed by: PHYSICIAN ASSISTANT

## 2024-09-25 PROCEDURE — 3700000001 HC ADD 15 MINUTES (ANESTHESIA): Performed by: ORTHOPAEDIC SURGERY

## 2024-09-25 PROCEDURE — 64447 NJX AA&/STRD FEMORAL NRV IMG: CPT | Performed by: STUDENT IN AN ORGANIZED HEALTH CARE EDUCATION/TRAINING PROGRAM

## 2024-09-25 PROCEDURE — 0QSH04Z REPOSITION LEFT TIBIA WITH INTERNAL FIXATION DEVICE, OPEN APPROACH: ICD-10-PCS | Performed by: ORTHOPAEDIC SURGERY

## 2024-09-25 PROCEDURE — 3700000000 HC ANESTHESIA ATTENDED CARE: Performed by: ORTHOPAEDIC SURGERY

## 2024-09-25 PROCEDURE — 2580000003 HC RX 258: Performed by: ORTHOPAEDIC SURGERY

## 2024-09-25 DEVICE — LOCKING SCREW
Type: IMPLANTABLE DEVICE | Site: ANKLE | Status: FUNCTIONAL
Brand: VARIAX

## 2024-09-25 DEVICE — BONE SCREW
Type: IMPLANTABLE DEVICE | Site: ANKLE | Status: FUNCTIONAL
Brand: VARIAX

## 2024-09-25 DEVICE — DISTAL LATERAL FIBULA PLATE, 4 HOLE
Type: IMPLANTABLE DEVICE | Site: ANKLE | Status: FUNCTIONAL
Brand: VARIAX

## 2024-09-25 DEVICE — CANNULATED SCREW
Type: IMPLANTABLE DEVICE | Site: ANKLE | Status: FUNCTIONAL
Brand: ASNIS

## 2024-09-25 DEVICE — BONE SCREW, FULLY THREADED,T10
Type: IMPLANTABLE DEVICE | Site: ANKLE | Status: FUNCTIONAL
Brand: VARIAX

## 2024-09-25 RX ORDER — INSULIN LISPRO 100 [IU]/ML
0-4 INJECTION, SOLUTION INTRAVENOUS; SUBCUTANEOUS NIGHTLY
Status: DISCONTINUED | OUTPATIENT
Start: 2024-09-25 | End: 2024-09-29 | Stop reason: HOSPADM

## 2024-09-25 RX ORDER — LIDOCAINE HYDROCHLORIDE 20 MG/ML
INJECTION, SOLUTION EPIDURAL; INFILTRATION; INTRACAUDAL; PERINEURAL
Status: DISCONTINUED | OUTPATIENT
Start: 2024-09-25 | End: 2024-09-25 | Stop reason: SDUPTHER

## 2024-09-25 RX ORDER — DEXAMETHASONE SODIUM PHOSPHATE 4 MG/ML
4 INJECTION, SOLUTION INTRA-ARTICULAR; INTRALESIONAL; INTRAMUSCULAR; INTRAVENOUS; SOFT TISSUE
Status: DISCONTINUED | OUTPATIENT
Start: 2024-09-25 | End: 2024-09-25 | Stop reason: HOSPADM

## 2024-09-25 RX ORDER — DEXAMETHASONE SODIUM PHOSPHATE 4 MG/ML
INJECTION, SOLUTION INTRA-ARTICULAR; INTRALESIONAL; INTRAMUSCULAR; INTRAVENOUS; SOFT TISSUE
Status: DISCONTINUED | OUTPATIENT
Start: 2024-09-25 | End: 2024-09-25 | Stop reason: SDUPTHER

## 2024-09-25 RX ORDER — NALOXONE HYDROCHLORIDE 0.4 MG/ML
INJECTION, SOLUTION INTRAMUSCULAR; INTRAVENOUS; SUBCUTANEOUS PRN
Status: DISCONTINUED | OUTPATIENT
Start: 2024-09-25 | End: 2024-09-25 | Stop reason: HOSPADM

## 2024-09-25 RX ORDER — ACETAMINOPHEN 325 MG/1
650 TABLET ORAL
Status: DISCONTINUED | OUTPATIENT
Start: 2024-09-25 | End: 2024-09-25 | Stop reason: HOSPADM

## 2024-09-25 RX ORDER — MIDAZOLAM HYDROCHLORIDE 1 MG/ML
INJECTION INTRAMUSCULAR; INTRAVENOUS
Status: DISCONTINUED | OUTPATIENT
Start: 2024-09-25 | End: 2024-09-25 | Stop reason: SDUPTHER

## 2024-09-25 RX ORDER — CEFAZOLIN SODIUM 1 G/3ML
INJECTION, POWDER, FOR SOLUTION INTRAMUSCULAR; INTRAVENOUS
Status: DISCONTINUED | OUTPATIENT
Start: 2024-09-25 | End: 2024-09-25 | Stop reason: SDUPTHER

## 2024-09-25 RX ORDER — DEXTROSE MONOHYDRATE 100 MG/ML
INJECTION, SOLUTION INTRAVENOUS CONTINUOUS PRN
Status: DISCONTINUED | OUTPATIENT
Start: 2024-09-25 | End: 2024-09-29 | Stop reason: HOSPADM

## 2024-09-25 RX ORDER — PHENYLEPHRINE HYDROCHLORIDE 10 MG/ML
INJECTION INTRAVENOUS
Status: DISCONTINUED | OUTPATIENT
Start: 2024-09-25 | End: 2024-09-25 | Stop reason: SDUPTHER

## 2024-09-25 RX ORDER — FENTANYL CITRATE 50 UG/ML
INJECTION, SOLUTION INTRAMUSCULAR; INTRAVENOUS
Status: DISCONTINUED | OUTPATIENT
Start: 2024-09-25 | End: 2024-09-25 | Stop reason: SDUPTHER

## 2024-09-25 RX ORDER — POTASSIUM CHLORIDE 750 MG/1
10 TABLET, EXTENDED RELEASE ORAL DAILY
Status: DISCONTINUED | OUTPATIENT
Start: 2024-09-25 | End: 2024-09-29 | Stop reason: HOSPADM

## 2024-09-25 RX ORDER — SODIUM CHLORIDE, SODIUM LACTATE, POTASSIUM CHLORIDE, CALCIUM CHLORIDE 600; 310; 30; 20 MG/100ML; MG/100ML; MG/100ML; MG/100ML
INJECTION, SOLUTION INTRAVENOUS
Status: DISCONTINUED | OUTPATIENT
Start: 2024-09-25 | End: 2024-09-25 | Stop reason: SDUPTHER

## 2024-09-25 RX ORDER — ATORVASTATIN CALCIUM 20 MG/1
20 TABLET, FILM COATED ORAL NIGHTLY
Status: DISCONTINUED | OUTPATIENT
Start: 2024-09-25 | End: 2024-09-29 | Stop reason: HOSPADM

## 2024-09-25 RX ORDER — GLUCAGON 1 MG/ML
1 KIT INJECTION PRN
Status: DISCONTINUED | OUTPATIENT
Start: 2024-09-25 | End: 2024-09-29 | Stop reason: HOSPADM

## 2024-09-25 RX ORDER — HYDROMORPHONE HYDROCHLORIDE 1 MG/ML
0.25 INJECTION, SOLUTION INTRAMUSCULAR; INTRAVENOUS; SUBCUTANEOUS EVERY 5 MIN PRN
Status: DISCONTINUED | OUTPATIENT
Start: 2024-09-25 | End: 2024-09-25 | Stop reason: HOSPADM

## 2024-09-25 RX ORDER — BUPIVACAINE HYDROCHLORIDE 2.5 MG/ML
INJECTION, SOLUTION EPIDURAL; INFILTRATION; INTRACAUDAL
Status: DISCONTINUED | OUTPATIENT
Start: 2024-09-25 | End: 2024-09-25 | Stop reason: SDUPTHER

## 2024-09-25 RX ORDER — INSULIN LISPRO 100 [IU]/ML
0-4 INJECTION, SOLUTION INTRAVENOUS; SUBCUTANEOUS
Status: DISCONTINUED | OUTPATIENT
Start: 2024-09-25 | End: 2024-09-29 | Stop reason: HOSPADM

## 2024-09-25 RX ORDER — SODIUM CHLORIDE 0.9 % (FLUSH) 0.9 %
5-40 SYRINGE (ML) INJECTION EVERY 12 HOURS SCHEDULED
Status: DISCONTINUED | OUTPATIENT
Start: 2024-09-25 | End: 2024-09-25 | Stop reason: HOSPADM

## 2024-09-25 RX ORDER — HYDRALAZINE HYDROCHLORIDE 20 MG/ML
10 INJECTION INTRAMUSCULAR; INTRAVENOUS
Status: DISCONTINUED | OUTPATIENT
Start: 2024-09-25 | End: 2024-09-25 | Stop reason: HOSPADM

## 2024-09-25 RX ORDER — SODIUM CHLORIDE 0.9 % (FLUSH) 0.9 %
5-40 SYRINGE (ML) INJECTION PRN
Status: DISCONTINUED | OUTPATIENT
Start: 2024-09-25 | End: 2024-09-25 | Stop reason: HOSPADM

## 2024-09-25 RX ORDER — FENTANYL CITRATE 50 UG/ML
25 INJECTION, SOLUTION INTRAMUSCULAR; INTRAVENOUS EVERY 5 MIN PRN
Status: DISCONTINUED | OUTPATIENT
Start: 2024-09-25 | End: 2024-09-25 | Stop reason: HOSPADM

## 2024-09-25 RX ORDER — ONDANSETRON 2 MG/ML
INJECTION INTRAMUSCULAR; INTRAVENOUS
Status: DISCONTINUED | OUTPATIENT
Start: 2024-09-25 | End: 2024-09-25 | Stop reason: SDUPTHER

## 2024-09-25 RX ORDER — SODIUM CHLORIDE 9 MG/ML
INJECTION, SOLUTION INTRAVENOUS PRN
Status: DISCONTINUED | OUTPATIENT
Start: 2024-09-25 | End: 2024-09-25 | Stop reason: HOSPADM

## 2024-09-25 RX ORDER — PROCHLORPERAZINE EDISYLATE 5 MG/ML
5 INJECTION INTRAMUSCULAR; INTRAVENOUS
Status: DISCONTINUED | OUTPATIENT
Start: 2024-09-25 | End: 2024-09-25 | Stop reason: HOSPADM

## 2024-09-25 RX ORDER — OXYCODONE HYDROCHLORIDE 5 MG/1
5 TABLET ORAL
Status: DISCONTINUED | OUTPATIENT
Start: 2024-09-25 | End: 2024-09-25 | Stop reason: HOSPADM

## 2024-09-25 RX ADMIN — ATORVASTATIN CALCIUM 20 MG: 20 TABLET, FILM COATED ORAL at 20:40

## 2024-09-25 RX ADMIN — SODIUM CHLORIDE, PRESERVATIVE FREE 10 ML: 5 INJECTION INTRAVENOUS at 10:48

## 2024-09-25 RX ADMIN — ONDANSETRON 4 MG: 2 INJECTION INTRAMUSCULAR; INTRAVENOUS at 15:55

## 2024-09-25 RX ADMIN — MORPHINE SULFATE 2 MG: 2 INJECTION, SOLUTION INTRAMUSCULAR; INTRAVENOUS at 02:24

## 2024-09-25 RX ADMIN — MIDAZOLAM HYDROCHLORIDE 2 MG: 1 INJECTION, SOLUTION INTRAMUSCULAR; INTRAVENOUS at 15:23

## 2024-09-25 RX ADMIN — LIDOCAINE HYDROCHLORIDE 100 MG: 20 INJECTION, SOLUTION EPIDURAL; INFILTRATION; INTRACAUDAL; PERINEURAL at 15:48

## 2024-09-25 RX ADMIN — DEXAMETHASONE SODIUM PHOSPHATE 8 MG: 4 INJECTION INTRA-ARTICULAR; INTRALESIONAL; INTRAMUSCULAR; INTRAVENOUS; SOFT TISSUE at 15:55

## 2024-09-25 RX ADMIN — BUPIVACAINE HYDROCHLORIDE 20 ML: 2.5 INJECTION, SOLUTION EPIDURAL; INFILTRATION; INTRACAUDAL at 15:34

## 2024-09-25 RX ADMIN — ACETAMINOPHEN 500 MG: 500 TABLET ORAL at 10:47

## 2024-09-25 RX ADMIN — PROPOFOL 40 MG: 10 INJECTION, EMULSION INTRAVENOUS at 15:23

## 2024-09-25 RX ADMIN — OXYCODONE 5 MG: 5 TABLET ORAL at 10:47

## 2024-09-25 RX ADMIN — PROPOFOL 150 MG: 10 INJECTION, EMULSION INTRAVENOUS at 15:48

## 2024-09-25 RX ADMIN — BUPIVACAINE HYDROCHLORIDE 20 ML: 2.5 INJECTION, SOLUTION EPIDURAL; INFILTRATION; INTRACAUDAL at 15:28

## 2024-09-25 RX ADMIN — SODIUM CHLORIDE, POTASSIUM CHLORIDE, SODIUM LACTATE AND CALCIUM CHLORIDE: 600; 310; 30; 20 INJECTION, SOLUTION INTRAVENOUS at 15:45

## 2024-09-25 RX ADMIN — FENTANYL CITRATE 50 MCG: 50 INJECTION, SOLUTION INTRAMUSCULAR; INTRAVENOUS at 16:53

## 2024-09-25 RX ADMIN — POTASSIUM CHLORIDE 10 MEQ: 750 TABLET, FILM COATED, EXTENDED RELEASE ORAL at 10:50

## 2024-09-25 RX ADMIN — BUPIVACAINE HYDROCHLORIDE 10 ML: 2.5 INJECTION, SOLUTION EPIDURAL; INFILTRATION; INTRACAUDAL at 15:36

## 2024-09-25 RX ADMIN — ENOXAPARIN SODIUM 30 MG: 100 INJECTION SUBCUTANEOUS at 06:32

## 2024-09-25 RX ADMIN — ACETAMINOPHEN 500 MG: 500 TABLET ORAL at 20:40

## 2024-09-25 RX ADMIN — FAMOTIDINE 20 MG: 20 TABLET, FILM COATED ORAL at 20:40

## 2024-09-25 RX ADMIN — CEFAZOLIN 2 G: 1 INJECTION, POWDER, FOR SOLUTION INTRAMUSCULAR; INTRAVENOUS at 15:55

## 2024-09-25 RX ADMIN — PHENYLEPHRINE HYDROCHLORIDE 200 MCG: 10 INJECTION INTRAVENOUS at 15:50

## 2024-09-25 RX ADMIN — FAMOTIDINE 20 MG: 20 TABLET, FILM COATED ORAL at 10:47

## 2024-09-25 RX ADMIN — BUPIVACAINE HYDROCHLORIDE 10 ML: 2.5 INJECTION, SOLUTION EPIDURAL; INFILTRATION; INTRACAUDAL at 15:31

## 2024-09-25 RX ADMIN — SODIUM CHLORIDE: 9 INJECTION, SOLUTION INTRAVENOUS at 06:33

## 2024-09-25 RX ADMIN — FENTANYL CITRATE 50 MCG: 50 INJECTION, SOLUTION INTRAMUSCULAR; INTRAVENOUS at 16:51

## 2024-09-25 RX ADMIN — SODIUM CHLORIDE, PRESERVATIVE FREE 10 ML: 5 INJECTION INTRAVENOUS at 21:57

## 2024-09-25 ASSESSMENT — PAIN SCALES - GENERAL
PAINLEVEL_OUTOF10: 7
PAINLEVEL_OUTOF10: 0
PAINLEVEL_OUTOF10: 8

## 2024-09-25 ASSESSMENT — PAIN DESCRIPTION - PAIN TYPE: TYPE: ACUTE PAIN

## 2024-09-25 ASSESSMENT — PAIN DESCRIPTION - DESCRIPTORS: DESCRIPTORS: ACHING

## 2024-09-25 ASSESSMENT — PAIN DESCRIPTION - LOCATION
LOCATION: LEG
LOCATION: ANKLE

## 2024-09-25 ASSESSMENT — PAIN DESCRIPTION - ORIENTATION
ORIENTATION: RIGHT;LEFT
ORIENTATION: RIGHT;LEFT

## 2024-09-25 NOTE — CONSULTS
Hospitalist Consult Note    NAME:   Talita Smalls   : 1955   MRN: [id number]     Date/Time: 2024 11:12 AM    Patient PCP: Sid Mark MD    ______________________________________________________________________    Assessment / Plan:    Bilateral ankle fractures  Management per orthopedics    Preop clearance:  RCRI Class I risk, 3.9% thirty day risk of death, MI, cardiac arrest. EKG showing sinus bradycardia.   - with recent warm sensation and dizziness, repeat EKG to ensure stability, added telemetry  - repeat EKG and telemetry not concerning, no other medical interventions would be necessary prior to surgery    Ground level fall  Presyncopal episode  HTN  Patient notes dizziness that has resolved and had been sweating earlier in the day [place]. Overall low suspicion for cardiac etiology, no chest pain or palpitations.  - orthostatics after surgery  - IVF?  - continue valsartan, hold HCTZ    Hx of bilateral ICA stenosis  Sickle cell trait  DM  HLD  Hold januvia, added low dose SSI       Medical Decision Making:   I personally reviewed labs: cbc bmp  I personally reviewed imaging: bilat ankle xr  I personally reviewed EKG: sinus bradycardia  Toxic drug monitoring: monitor blood sugar for hypoglycemia from insulin toxicity  Discussed case with: Orthopedics, RN      Code Status: Full  DVT Prophylaxis: per Ortho    Subjective:   CHIEF COMPLAINT: ankle pain    HISTORY OF PRESENT ILLNESS:     Talita Smalls is a 68 y.o.  female with PMHx significant for the above presents after a mechanical fall leading to bilateral ankle fractures. Patient state that she was mildly dizzy yesterday, notes sweating when walking at the mall. Patient tripped and broke both ankles. Did not hit her head, did not pass out. No chest pain or palpitations. States earlier she was walking at the mall, became overheated. No nausea or vomiting. No headaches. No dysuria, no hematochezia. Denies hx of

## 2024-09-25 NOTE — ED NOTES
Patient called out requesting pain medication. After medicating patient requested repositioning in bed, patient repositioned, insured external catheter remains in place, call bell within reach.

## 2024-09-25 NOTE — OP NOTE
OPERATIVE REPORT    FACILITY: [facility]    PATIENT NAME: Talita Smalls     DATE OF OPERATION: 9/25/24    PREOPERATIVE DIAGNOSIS:  right trimalleolar ankle fracture.  Left ankle medial malleolar fracture    POSTOPERATIVE DIAGNOSIS:   same    SURGERIES PERFORMED:   Open reduction with internal fixation right trimalleolar ankle fracture without fixation of posterior malleolus  Open reduction with internal fixation left ankle medial malleolar fracture    ATTENDING PHYSICIAN: Naveen Sandoval MD    ASSISTANT: Sasha Scruggs    IMPLANTS:  [place] [employer] 4 hole distal fibular plate, 4.0 ASNIS screws x3    SPECIMENS:  none    OPERATIVE FINDINGS:  stable fixation    ANESTHESIA: general    FLUIDS: Please see anesthesia record    ESTIMATED BLOOD LOSS: minimal     TOURNIQUET TIME: 37 min    INDICATIONS FOR PROCEDURE: This patient is a 68 y.o. female who presented to our institution with bilateral ankle fractures. After a detailed discussion regarding the risks, benefits, and alternatives to surgery, informed consent was obtained. The patient presents now to the operating room for that operative procedure.     DETAILED DESCRIPTION OF PROCEDURE: The patient was identified in preoperative holding. The operative extremity was marked. The patient was then taken back to the operating room where the anesthetic of choice was administered. The patient was positioned on the operating room table taking care to pad all bony prominences. The operative site was prepped and draped in the usual sterile fashion. Appropriate antibiotics were administered and timeouts were performed in keeping with guidelines.    The right ankle was addressed first. The tourniquet was inflated. A lateral incision was made of the peroneal fascia and then the peroneal fascia was incised longitudinally off the posterior border of the fibula. The superficial peroneal nerve was identified and protected anteriorly. Following this, subperiosteal exposure was done around the

## 2024-09-25 NOTE — PROGRESS NOTES
Spiritual Care Partner Volunteer visited patient at [facility] in [facility] on 9/25/2024   Documented by:    ANA Nieves  [facility]   Paging Service [phone])

## 2024-09-25 NOTE — H&P
Orthopedic Consult Note:    HPI:  Talita Smalls is an 68 y.o. female who presents with bilateral ankle pain after ground level fall. Immediate pain and inability to bear weight. Brought to ER by EMS and imaging revealed bilateral ankle fracture. Orthopedics was consulted for surgical management of this patient. Patient No dementia at baseline. Community ambulator at baseline without assistive devices.    Sons wedding is Oct 5    Physical Exam:  A&O x 3  CV: rrr  Pulm: nonlabored  Focused exam of bilateral lower extremity:  Splints in place  SILT to toes  NVI to toes    Imaging:  I have independently reviewed and interpreted x-rays of bilateral ankle fractures which demonstrate trimalleolar ankle fracture on right and mildly displace medial malleolar fracture on left.    A/P:  68 y.o. female with bilateral ankle fractures    Diagnosis and imaging were reviewed. Risks of surgery were reviewed including pain, infection, bleeding, blood clots, nonunion, malunion, fracture, neurovascular injury, need for re-operation, and anesthesia complications including death. These were discussed with the patient in a shared decision-making process in understandable terms where the relative pros and cons were considered. After an informed discussion of risks, benefits, and alternatives, and understanding the risks involved, the patient decided to proceed with surgery.       -NPO   -plan for OR today

## 2024-09-25 NOTE — FLOWSHEET NOTE
09/25/24 1715   Handoff   Communication Given Periop Handoff/Relief   Handoff phase Phase I receiving   Handoff Given To David Ahuja RN   Handoff Received From Abby Grier RN/Cb Ibanez CRNA   Handoff Communication Face to Face;At bedside   Time Handoff Given 0355

## 2024-09-25 NOTE — CONSULTS
Ortho:    Remote consult  Pt transferred from [facility]-- per chart review pt became light headed when attempting to remove heavy mattress from packaging     Bilateral ankle fractures  Ambulates at baseline unassisted    Ortho to admit  Hospitalist consult --- pre op evaluation, medical optimization, post op mgt as needed  Bedrest  NPO after midnight  Aggressive elevation of R/L lower ext  Single dose of  30mg lovenox now for DVT prophylaxis   Plan for open reduction and internal fixation of the right and left ankles tomorrow 9/25      Per Dr Jaime Gustafson PA-C      Per pharmacy dispense review-- Home medication include---  Atorvastatin 20 mg QD  Januvia 100mg QD  Valsartan/HCTZ 160/25 QD  Potassium  ER 10 MEQ lex

## 2024-09-25 NOTE — ED NOTES
Bedside and Verbal shift change report given to Adrienne (oncoming nurse) by Brandy (offgoing nurse). Report included the following information ED SBAR, Adult Overview, MAR, Recent Results, and Neuro Assessment.

## 2024-09-25 NOTE — ED PROVIDER NOTES
disintegrating tablet 4 mg (has no administration in time range)     Or   ondansetron (ZOFRAN) injection 4 mg (has no administration in time range)   famotidine (PEPCID) tablet 20 mg (0 mg Oral Held 9/24/24 2130)   0.9 % sodium chloride infusion (has no administration in time range)   atorvastatin (LIPITOR) tablet 20 mg (has no administration in time range)   valsartan (DIOVAN) tablet 160 mg (has no administration in time range)   insulin lispro (HUMALOG,ADMELOG) injection vial 0-8 Units (has no administration in time range)   insulin lispro (HUMALOG,ADMELOG) injection vial 0-4 Units (has no administration in time range)   enoxaparin Sodium (LOVENOX) injection 30 mg (has no administration in time range)       ED Orders Placed:  Orders Placed This Encounter   Procedures    XR CHEST PORTABLE    CBC with Auto Differential    Comprehensive Metabolic Panel    Diet NPO    Vital Signs (Recheck)    Admission/Observation order previously placed    Verify pre-procedure history and physical completed    Verify history and physical completed    Procedure Consent    Diagnosis for Procedure    Vital signs per unit routine    Notify physician    Strict Bedrest    Intake and output    Neurovascular checks    Initiate Hypoglycemia Treatment    Chlorhexidine gluconate wipes    Place intermittent pneumatic compression device    Elevate legs    Full code    Inpatient consult to Orthopedic Surgery    Inpatient consult to Case Management    Inpatient consult to Hospitalist    Initiate Oxygen Therapy Protocol    TYPE AND SCREEN    ADMIT TO INPATIENT         ED PROCEDURES     Unless otherwise documented all procedures performed by me. Verito Zurita MD     Procedure Note - Splint/Brace/Sling/Immobilizer Placement:  1:39 AM EDT  Performed by: Verito Zurita MD   Neurovascularly intact prior to application.  An Orthoglass posterior and stirrup  splint was placed on pt's left ankle.  Joint was placed in neutral position.  The extremity was

## 2024-09-25 NOTE — ANESTHESIA POST-OP
TRANSFER - OUT REPORT:    Verbal report given to Michele Michaels RN (name) on Talita Smalls  being transferred to [facility](unit) for routine post-op       Report consisted of patient’s Situation, Background, Assessment and   Recommendations(SBAR).     Information from the following report(s) Surgery Report, Intake/Output, and MAR was reviewed with the receiving nurse.    Opportunity for questions and clarification was provided.      Patient transported with:   Monitor  O2 @ 2 liters  Registered Nurse  Tech

## 2024-09-25 NOTE — CARE COORDINATION
CM acknowledged receipt of consult to assist with home health or other discharge needs. Chart review completed, pt scheduled for surgery, therapy evaluations are pending. CM following up with care team for recommendations re potential discharge needs and will discuss with pt as appropriate.    Gail Lomeli, [facility]  Care Management  [phone]

## 2024-09-26 LAB
EST. AVERAGE GLUCOSE BLD GHB EST-MCNC: 140 MG/DL
GLUCOSE BLD STRIP.AUTO-MCNC: 104 MG/DL (ref 65–117)
GLUCOSE BLD STRIP.AUTO-MCNC: 123 MG/DL (ref 65–117)
GLUCOSE BLD STRIP.AUTO-MCNC: 133 MG/DL (ref 65–117)
GLUCOSE BLD STRIP.AUTO-MCNC: 89 MG/DL (ref 65–117)
GLUCOSE BLD STRIP.AUTO-MCNC: 94 MG/DL (ref 65–117)
HBA1C MFR BLD: 6.5 % (ref 4–5.6)
SERVICE CMNT-IMP: ABNORMAL
SERVICE CMNT-IMP: ABNORMAL
SERVICE CMNT-IMP: NORMAL

## 2024-09-26 PROCEDURE — 97165 OT EVAL LOW COMPLEX 30 MIN: CPT

## 2024-09-26 PROCEDURE — 2580000003 HC RX 258: Performed by: ORTHOPAEDIC SURGERY

## 2024-09-26 PROCEDURE — 6370000000 HC RX 637 (ALT 250 FOR IP): Performed by: ORTHOPAEDIC SURGERY

## 2024-09-26 PROCEDURE — 1100000000 HC RM PRIVATE

## 2024-09-26 PROCEDURE — 6360000002 HC RX W HCPCS: Performed by: ORTHOPAEDIC SURGERY

## 2024-09-26 PROCEDURE — 82962 GLUCOSE BLOOD TEST: CPT

## 2024-09-26 PROCEDURE — 97535 SELF CARE MNGMENT TRAINING: CPT

## 2024-09-26 PROCEDURE — 97162 PT EVAL MOD COMPLEX 30 MIN: CPT

## 2024-09-26 PROCEDURE — 97530 THERAPEUTIC ACTIVITIES: CPT

## 2024-09-26 PROCEDURE — APPNB30 APP NON BILLABLE TIME 0-30 MINS: Performed by: PHYSICIAN ASSISTANT

## 2024-09-26 RX ORDER — FAMOTIDINE 20 MG/1
20 TABLET, FILM COATED ORAL DAILY
Status: DISCONTINUED | OUTPATIENT
Start: 2024-09-27 | End: 2024-09-29 | Stop reason: HOSPADM

## 2024-09-26 RX ORDER — SENNA AND DOCUSATE SODIUM 50; 8.6 MG/1; MG/1
1 TABLET, FILM COATED ORAL DAILY
Qty: 14 TABLET | Refills: 0 | Status: SHIPPED | OUTPATIENT
Start: 2024-09-26 | End: 2024-10-10

## 2024-09-26 RX ORDER — OXYCODONE HYDROCHLORIDE 5 MG/1
5 TABLET ORAL EVERY 8 HOURS PRN
Qty: 20 TABLET | Refills: 0 | Status: SHIPPED | OUTPATIENT
Start: 2024-09-26 | End: 2024-10-03

## 2024-09-26 RX ORDER — ACETAMINOPHEN 500 MG
500 TABLET ORAL EVERY 6 HOURS
Status: SHIPPED | COMMUNITY
Start: 2024-09-26

## 2024-09-26 RX ADMIN — MORPHINE SULFATE 2 MG: 2 INJECTION, SOLUTION INTRAMUSCULAR; INTRAVENOUS at 18:57

## 2024-09-26 RX ADMIN — WATER 2000 MG: 1 INJECTION INTRAMUSCULAR; INTRAVENOUS; SUBCUTANEOUS at 00:11

## 2024-09-26 RX ADMIN — ATORVASTATIN CALCIUM 20 MG: 20 TABLET, FILM COATED ORAL at 20:24

## 2024-09-26 RX ADMIN — FAMOTIDINE 20 MG: 20 TABLET, FILM COATED ORAL at 07:58

## 2024-09-26 RX ADMIN — OXYCODONE 5 MG: 5 TABLET ORAL at 20:24

## 2024-09-26 RX ADMIN — ACETAMINOPHEN 500 MG: 500 TABLET ORAL at 03:20

## 2024-09-26 RX ADMIN — WATER 2000 MG: 1 INJECTION INTRAMUSCULAR; INTRAVENOUS; SUBCUTANEOUS at 07:59

## 2024-09-26 RX ADMIN — SODIUM CHLORIDE, PRESERVATIVE FREE 10 ML: 5 INJECTION INTRAVENOUS at 07:59

## 2024-09-26 RX ADMIN — ACETAMINOPHEN 500 MG: 500 TABLET ORAL at 07:58

## 2024-09-26 RX ADMIN — OXYCODONE 5 MG: 5 TABLET ORAL at 16:30

## 2024-09-26 RX ADMIN — POTASSIUM CHLORIDE 10 MEQ: 750 TABLET, FILM COATED, EXTENDED RELEASE ORAL at 07:58

## 2024-09-26 RX ADMIN — ACETAMINOPHEN 500 MG: 500 TABLET ORAL at 14:49

## 2024-09-26 RX ADMIN — ACETAMINOPHEN 500 MG: 500 TABLET ORAL at 20:24

## 2024-09-26 ASSESSMENT — PAIN SCALES - GENERAL
PAINLEVEL_OUTOF10: 4
PAINLEVEL_OUTOF10: 7
PAINLEVEL_OUTOF10: 4
PAINLEVEL_OUTOF10: 0
PAINLEVEL_OUTOF10: 0
PAINLEVEL_OUTOF10: 5
PAINLEVEL_OUTOF10: 7

## 2024-09-26 ASSESSMENT — PAIN SCALES - WONG BAKER: WONGBAKER_NUMERICALRESPONSE: HURTS A LITTLE BIT

## 2024-09-26 ASSESSMENT — PAIN DESCRIPTION - LOCATION
LOCATION: ANKLE;TOE (COMMENT WHICH ONE)
LOCATION: FOOT
LOCATION: FOOT

## 2024-09-26 ASSESSMENT — PAIN DESCRIPTION - ORIENTATION
ORIENTATION: RIGHT
ORIENTATION: LEFT
ORIENTATION: RIGHT
ORIENTATION: RIGHT

## 2024-09-26 ASSESSMENT — PAIN DESCRIPTION - DESCRIPTORS
DESCRIPTORS: ACHING
DESCRIPTORS: ACHING

## 2024-09-26 NOTE — PLAN OF CARE
Problem: Pain  Goal: Verbalizes/displays adequate comfort level or baseline comfort level  Outcome: Progressing     Problem: Safety - Adult  Goal: Free from fall injury  Outcome: Progressing     Problem: Skin/Tissue Integrity  Goal: Absence of new skin breakdown  Description: 1.  Monitor for areas of redness and/or skin breakdown  2.  Assess vascular access sites hourly  3.  Every 4-6 hours minimum:  Change oxygen saturation probe site  4.  Every 4-6 hours:  If on nasal continuous positive airway pressure, respiratory therapy assess nares and determine need for appliance change or resting period.  Outcome: Progressing     Problem: Occupational Therapy - Adult  Goal: By Discharge: Performs self-care activities at highest level of function for planned discharge setting.  See evaluation for individualized goals.  Description: FUNCTIONAL STATUS PRIOR TO ADMISSION:  Patient was ambulatory using no DME, reporting she was Independent with ADLs/IADLs.   , ADL Assistance: Independent,  ,  ,  ,  ,  , Homemaking Assistance: Independent,  ,  ,       HOME SUPPORT: Patient lived alone; however, daughter plans to stay with pt upon D/C home.    Occupational Therapy Goals:  Initiated 9/26/2024  1.  Patient will perform W/C level grooming with Modified [place] within 7 day(s).  2.  Patient will perform EOB lower body dressing with Set-up within 7 day(s).  3.  Patient will perform EOB bathing with Set-up within 7 day(s).  4.  Patient will perform toilet transfers, lateral transfer to/from drop arm BSC, with Modified [place]  within 7 day(s).  5.  Patient will perform all aspects of BSC toileting with Modified [place] within 7 day(s).  6.  Patient will participate in upper extremity therapeutic exercise/activities with Modified [place] for 10 minutes within 7 day(s).        9/26/2024 1227 by Kerley, Matthew, OT  Outcome: Progressing     Problem: Physical Therapy - Adult  Goal: By Discharge: Performs mobility at

## 2024-09-26 NOTE — CARE COORDINATION
Transition of Care Plan:    RUR: 12% low risk  Prior Level of Functioning: independent  Disposition: home with [facility]/OT/nursing/ care aide; DME   If SNF or IPR: Date FOC offered: n/a  Date FOC received:   Accepting facility:   Date authorization started with reference number:   Date authorization received and expires:   Follow up appointments: PCP and specialist as recommended  DME needed: 18\" wheelchair with bilateral elevating leg rests and swivel arm rests, bedside commode, transfer bench, sliding/ transfer board  Transportation at discharge: BLS stretcher needed  IM/IMM Medicare/ letter given: 9/24/24  Is patient a [place] and connected with [facility]? N/a   If yes, was [place] transfer form completed and [facility] notified?   Caregiver Contact: daughter Marbella Smalls [phone]  Discharge Caregiver contacted prior to discharge? CM will contact if pt requests  Care Conference needed? no  Barriers to discharge: DME delivery    Met with pt at bedside, pt's sister Teresa in room, pt verbalized permission to hold discharge planning conversation with Teresa present. Discussed therapy recommendations for home health services and DME, pt verbalized understanding of recommendations, no preferences for agencies, CM educated on bridge to home program through [facility] for additional support during initial transition home, pt voiced preference for this agency and program, CM sending referral. Pt reported that she has had a ramp donated to her and is working to coordinate a contractor friend picking it up to bring to pt's home, CM validated this proactive plan, informed pt that stretcher transport will be arranged for discharge so ramp is not barrier to leaving hospital, pt verbalized understanding and relief. Pt shared that her son is getting  next Saturday 10/5 and she will attend, CM offered congratulations and affirmed pt's goal of attending. Verified address and pt's mobile phone

## 2024-09-26 NOTE — DISCHARGE INSTRUCTIONS
Discharge Instructions:  Talita Smalls    Surgery:  Open reduction Internal Fixation of bilateral ankle fractures .        To relieve pain:  Use ice/gel packs.    -Put the ice pack directly over the wound, or anywhere you are hurting or swollen.   -To control pain and swelling, keep ice on regularly, especially after physical activity.  -The packs should stay cold for 3-4 hours.  When it is not cold anymore, rotate with the packs in the freezer.      Elevate your leg.  This will also keep swelling down.    Rest for at least 20 minutes between activity or exercises.    To keep track of your pain medications, write down what you take and when you take it.    The last dose of pain medication you got in the hospital was:     Medication    Dose    Date & Time      Choose your medications based on the pain scale below:    To keep your pain under control, take Tylenol every 6 hours for 14 days - even if you feel like you don’t need it.     For mild to moderate pain (4-6 on pain scale), take one pain pill every 4 hours or as instructed.         To prevent nausea, take your pain medications with food.                                            Pain Scale              As your pain lessens:    Slowly start taking less pain medication. You may do this by waiting longer between doses or by taking smaller doses.      Lovenox 40 mg one shot daily for thirty(30) days after surgery to help prevent blood clots        To increase and promote healing:  Stop Smoking (or at least cut back on smoking).            Eat a well-balanced diet. High in protein and Vitamin C.     If you have a poor appetite, supplement your diet by drinking Ensure, Glucerna or [place] Instant Breakfast for the next 30 days     If you are a diabetic, control you blood sugars to prevent infection and help your wound heal.                     Prevent Infection:    Wash your hands                       -This is the most important thing you or your caregiver

## 2024-09-26 NOTE — PROGRESS NOTES
End of Shift Note    Bedside shift change report given to Krys OLSEN (oncoming nurse) by Hemal Bone RN (offgoing nurse).  Report included the following information SBAR, Kardex, Procedure Summary, Intake/Output, MAR, Recent Results, and Cardiac Rhythm SR    Shift worked:  Nights       Shift summary and any significant changes:     none     Concerns for physician to address:  none     Zone phone for oncoming shift:   [phone]       Activity:     Number times ambulated in hallways past shift: 0  Number of times OOB to chair past shift: 0    Cardiac:   Cardiac Monitoring: Yes           Access:  Current line(s): PIV     Genitourinary:   Urinary status: voiding and external catheter    Respiratory:      Chronic home O2 use?: NO  Incentive spirometer at bedside: NO       GI:     Current diet:  ADULT DIET; Regular  Passing flatus: YES  Tolerating current diet: YES       Pain Management:   Patient states pain is manageable on current regimen: YES    Skin:     Interventions: float heels and increase time out of bed    Patient Safety:  Fall Score:    Interventions: bed/chair alarm       Length of Stay:  Expected LOS: 3  Actual LOS: 2      Hemal Bone RN

## 2024-09-26 NOTE — PLAN OF CARE
Problem: Physical Therapy - Adult  Goal: By Discharge: Performs mobility at highest level of function for planned discharge setting.  See evaluation for individualized goals.  Description: FUNCTIONAL STATUS PRIOR TO ADMISSION: Independent w/ ambulation and ADLs. Denies history of falls other than fall that occurred just prior to admission. Works part-time.     HOME SUPPORT PRIOR TO ADMISSION: The patient lived alone however has supportive daughter who plans to stay with her indefinitely.     Physical Therapy Goals  Initiated 9/26/2024  1.  Patient will move from supine to sit and sit to supine, scoot up and down, and roll side to side in bed with supervision/set-up within 7 day(s).    2.  Patient will transfer from bed to chair and chair to bed via lateral transfer and/or sliding board transfer with supervision/set-up using the least restrictive device within 7 day(s).  Outcome: Progressing   PHYSICAL THERAPY EVALUATION    Patient: Taltia Smalls (68 y.o. female)  Date: 9/26/2024  Primary Diagnosis: Closed fracture of both ankles, initial encounter [S82.891A, S82.892A]  Bilateral ankle fractures, closed, initial encounter [S82.891A, S82.892A]  Procedure(s) (LRB):  BILATERAL ANKLE OPEN REDUCTION INTERNAL FIXATION (Bilateral) 1 Day Post-Op   Precautions: Restrictions/Precautions: Weight Bearing   Lower Extremity Weight Bearing Restrictions  Right Lower Extremity Weight Bearing: Toe Touch Weight Bearing  Partial Weight Bearing Percentage Or Pounds: LLE WBAT for transfers only                  ASSESSMENT :   DEFICITS/IMPAIRMENTS:   The patient is limited by TTWB precaution of RLE, WBAT of LLE for transfers only, generalized weakness, impaired activity tolerance, and overall impaired functional mobility. Pt with excellent BUE strength as well as intact balance throughout static and dynamic tasks. Pt tolerated lateral transfer from EOB>>drop arm bedside chair w/ SB/CGAx1. Good adherence to weightbearing precautions of

## 2024-09-26 NOTE — PROGRESS NOTES
Department of Orthopedic Surgery  Physician Assistant Progress Note      SUBJECTIVE  pod 1 bilateral ankle fracture orif     OBJECTIVE  Appropriate post operative pain.  Denies n/v  Working well with pt    Physical    Visit Vitals  BP (!) 143/47   Pulse 70   Temp 97.5 °F (36.4 °C)   Resp 18   Ht 1.549 m (5' 1\")   Wt 68 kg (149 lb 14.6 oz)   SpO2 99%   BMI 28.33 kg/m²       CONSTITUTIONAL:  awake, alert, cooperative, no apparent distress, and appears stated age  MUSCULOSKELETAL:  incision clean and dry. Dressing intact  Musculoskeletal: Estelita's sign negative in bilateral lower extremities. Calves soft, supple, non-tender upon palpation or with passive stretch.     NEUROLOGIC:  Awake, alert, oriented to name, place and time.      ASSESSMENT AND PLAN      Post op   Oob with pt  Dvd per primary team  Discharge pending placement       POSTOPERATIVE PLAN: She will be TTWB to the right leg for 6 weeks. She will be allowed to WBAT for transfers only on the left leg.     FOLLOW UP: We will plan to see the patient back in 2 weeks in clinic for routine checkup.

## 2024-09-26 NOTE — PROGRESS NOTES
End of Shift Note      Shift worked:  7am-7pm     Shift summary and any significant changes:    Pt voices good pain control and worked with therapy using slide board to chair and back to bed in afternoon with nursing.  Pt voiding via purewick.  BP noted to be slightly low and ok to hold per dr. Mendel.       Concerns for physician to address: NA       Activity:     Number times ambulated in hallways past shift: 0  Number of times OOB to chair past shift: 1    Cardiac:   Cardiac Monitoring: Yes           Access:  Current line(s): PIV     Genitourinary:   Urinary status: voiding and external catheter    Respiratory:      Chronic home O2 use?: NO       GI:     Current diet:  ADULT DIET; Regular  Passing flatus: YES  Tolerating current diet: YES       Pain Management:   Patient states pain is manageable on current regimen: YES    Skin:     Interventions: PT/OT consult, limit briefs, internal/external urinary devices, and nutritional support    Patient Safety:  Fall Score:    Interventions: bed/chair alarm, assistive device (walker, cane. etc), gripper socks, pt to call before getting OOB, and stay with me (per policy)       Length of Stay:  Expected LOS: 3  Actual LOS: 2      Krys Foley, RN

## 2024-09-26 NOTE — PLAN OF CARE
Problem: Occupational Therapy - Adult  Goal: By Discharge: Performs self-care activities at highest level of function for planned discharge setting.  See evaluation for individualized goals.  Description: FUNCTIONAL STATUS PRIOR TO ADMISSION:  Patient was ambulatory using no DME, reporting she was Independent with ADLs/IADLs.   , ADL Assistance: Independent,  ,  ,  ,  ,  , Homemaking Assistance: Independent,  ,  ,       HOME SUPPORT: Patient lived alone; however, daughter plans to stay with pt upon D/C home.    Occupational Therapy Goals:  Initiated 9/26/2024  1.  Patient will perform W/C level grooming with Modified [place] within 7 day(s).  2.  Patient will perform EOB lower body dressing with Set-up within 7 day(s).  3.  Patient will perform EOB bathing with Set-up within 7 day(s).  4.  Patient will perform toilet transfers, lateral transfer to/from drop arm BSC, with Modified [place]  within 7 day(s).  5.  Patient will perform all aspects of BSC toileting with Modified [place] within 7 day(s).  6.  Patient will participate in upper extremity therapeutic exercise/activities with Modified [place] for 10 minutes within 7 day(s).        Outcome: Progressing     OCCUPATIONAL THERAPY EVALUATION    Patient: Talita Smalls (68 y.o. female)  Date: 9/26/2024  Primary Diagnosis: Closed fracture of both ankles, initial encounter [S82.891A, S82.892A]  Bilateral ankle fractures, closed, initial encounter [S82.891A, S82.892A]  Procedure(s) (LRB):  BILATERAL ANKLE OPEN REDUCTION INTERNAL FIXATION (Bilateral) 1 Day Post-Op     Precautions: Weight Bearing Right Lower Extremity Weight Bearing: Toe Touch Weight Bearing                ASSESSMENT :  The patient is limited by decreased strength/endurance, decreased higher level mobility/balance, decreased full body reaching, hypersensitivity to R foot and TTWB to RLE and WBAT to LLE solely for transfers, all of which limit pt's ability to safely complete

## 2024-09-27 LAB
GLUCOSE BLD STRIP.AUTO-MCNC: 108 MG/DL (ref 65–117)
GLUCOSE BLD STRIP.AUTO-MCNC: 137 MG/DL (ref 65–117)
GLUCOSE BLD STRIP.AUTO-MCNC: 140 MG/DL (ref 65–117)
GLUCOSE BLD STRIP.AUTO-MCNC: 89 MG/DL (ref 65–117)
SERVICE CMNT-IMP: ABNORMAL
SERVICE CMNT-IMP: ABNORMAL
SERVICE CMNT-IMP: NORMAL
SERVICE CMNT-IMP: NORMAL

## 2024-09-27 PROCEDURE — 6370000000 HC RX 637 (ALT 250 FOR IP): Performed by: ORTHOPAEDIC SURGERY

## 2024-09-27 PROCEDURE — 1100000000 HC RM PRIVATE

## 2024-09-27 PROCEDURE — 2580000003 HC RX 258: Performed by: ORTHOPAEDIC SURGERY

## 2024-09-27 PROCEDURE — 97110 THERAPEUTIC EXERCISES: CPT

## 2024-09-27 PROCEDURE — 82962 GLUCOSE BLOOD TEST: CPT

## 2024-09-27 PROCEDURE — 6370000000 HC RX 637 (ALT 250 FOR IP): Performed by: INTERNAL MEDICINE

## 2024-09-27 PROCEDURE — 97530 THERAPEUTIC ACTIVITIES: CPT

## 2024-09-27 PROCEDURE — APPNB15 APP NON BILLABLE TIME 0-15 MINS: Performed by: PHYSICIAN ASSISTANT

## 2024-09-27 PROCEDURE — 6360000002 HC RX W HCPCS: Performed by: ORTHOPAEDIC SURGERY

## 2024-09-27 RX ORDER — LACTULOSE 10 G/15ML
20 SOLUTION ORAL 3 TIMES DAILY
Status: DISCONTINUED | OUTPATIENT
Start: 2024-09-27 | End: 2024-09-29 | Stop reason: HOSPADM

## 2024-09-27 RX ORDER — ENOXAPARIN SODIUM 100 MG/ML
40 INJECTION SUBCUTANEOUS DAILY
Status: DISCONTINUED | OUTPATIENT
Start: 2024-09-27 | End: 2024-09-29 | Stop reason: HOSPADM

## 2024-09-27 RX ADMIN — OXYCODONE 5 MG: 5 TABLET ORAL at 14:20

## 2024-09-27 RX ADMIN — SODIUM CHLORIDE, PRESERVATIVE FREE 5 ML: 5 INJECTION INTRAVENOUS at 00:48

## 2024-09-27 RX ADMIN — SODIUM CHLORIDE, PRESERVATIVE FREE 10 ML: 5 INJECTION INTRAVENOUS at 09:18

## 2024-09-27 RX ADMIN — OXYCODONE 5 MG: 5 TABLET ORAL at 00:16

## 2024-09-27 RX ADMIN — ACETAMINOPHEN 500 MG: 500 TABLET ORAL at 03:57

## 2024-09-27 RX ADMIN — ATORVASTATIN CALCIUM 20 MG: 20 TABLET, FILM COATED ORAL at 20:55

## 2024-09-27 RX ADMIN — SODIUM CHLORIDE, PRESERVATIVE FREE 10 ML: 5 INJECTION INTRAVENOUS at 20:56

## 2024-09-27 RX ADMIN — OXYCODONE 5 MG: 5 TABLET ORAL at 09:18

## 2024-09-27 RX ADMIN — ACETAMINOPHEN 500 MG: 500 TABLET ORAL at 09:18

## 2024-09-27 RX ADMIN — OXYCODONE 5 MG: 5 TABLET ORAL at 23:21

## 2024-09-27 RX ADMIN — LACTULOSE 20 G: 10 SOLUTION ORAL at 20:55

## 2024-09-27 RX ADMIN — MORPHINE SULFATE 2 MG: 2 INJECTION, SOLUTION INTRAMUSCULAR; INTRAVENOUS at 10:35

## 2024-09-27 RX ADMIN — OXYCODONE 5 MG: 5 TABLET ORAL at 05:26

## 2024-09-27 RX ADMIN — POLYETHYLENE GLYCOL 3350 17 G: 17 POWDER, FOR SOLUTION ORAL at 09:25

## 2024-09-27 RX ADMIN — ACETAMINOPHEN 500 MG: 500 TABLET ORAL at 20:55

## 2024-09-27 RX ADMIN — POTASSIUM CHLORIDE 10 MEQ: 750 TABLET, FILM COATED, EXTENDED RELEASE ORAL at 09:18

## 2024-09-27 RX ADMIN — FAMOTIDINE 20 MG: 20 TABLET, FILM COATED ORAL at 09:18

## 2024-09-27 RX ADMIN — ACETAMINOPHEN 500 MG: 500 TABLET ORAL at 14:20

## 2024-09-27 RX ADMIN — LACTULOSE 20 G: 10 SOLUTION ORAL at 16:32

## 2024-09-27 RX ADMIN — OXYCODONE 5 MG: 5 TABLET ORAL at 18:38

## 2024-09-27 ASSESSMENT — PAIN SCALES - WONG BAKER
WONGBAKER_NUMERICALRESPONSE: HURTS A LITTLE BIT

## 2024-09-27 ASSESSMENT — PAIN DESCRIPTION - ORIENTATION
ORIENTATION: RIGHT

## 2024-09-27 ASSESSMENT — PAIN SCALES - GENERAL
PAINLEVEL_OUTOF10: 8
PAINLEVEL_OUTOF10: 7
PAINLEVEL_OUTOF10: 5
PAINLEVEL_OUTOF10: 2
PAINLEVEL_OUTOF10: 7
PAINLEVEL_OUTOF10: 7
PAINLEVEL_OUTOF10: 6
PAINLEVEL_OUTOF10: 6
PAINLEVEL_OUTOF10: 4
PAINLEVEL_OUTOF10: 5
PAINLEVEL_OUTOF10: 6
PAINLEVEL_OUTOF10: 7
PAINLEVEL_OUTOF10: 8
PAINLEVEL_OUTOF10: 3
PAINLEVEL_OUTOF10: 6

## 2024-09-27 ASSESSMENT — PAIN DESCRIPTION - LOCATION
LOCATION: LEG
LOCATION: LEG
LOCATION: ANKLE
LOCATION: FOOT
LOCATION: ANKLE
LOCATION: LEG
LOCATION: ANKLE
LOCATION: LEG

## 2024-09-27 ASSESSMENT — PAIN DESCRIPTION - ONSET: ONSET: ON-GOING

## 2024-09-27 ASSESSMENT — PAIN DESCRIPTION - DESCRIPTORS
DESCRIPTORS: TIGHTNESS
DESCRIPTORS: ACHING
DESCRIPTORS: THROBBING
DESCRIPTORS: ACHING
DESCRIPTORS: DISCOMFORT

## 2024-09-27 ASSESSMENT — PAIN DESCRIPTION - PAIN TYPE: TYPE: SURGICAL PAIN

## 2024-09-27 ASSESSMENT — PAIN DESCRIPTION - FREQUENCY: FREQUENCY: CONTINUOUS

## 2024-09-27 NOTE — PROGRESS NOTES
End of Shift Note        Shift worked: 7am-7pm     Shift summary and any significant changes:    Pt up to chair and using slide board for transfers to chair and back to bed with nursing assistance.  Pt medicated for pain and dressing clean dry and intact.     Concerns for physician to address: na     Zone phone for oncoming shift:  na        Activity:     Number times ambulated in hallways past shift: 0  Number of times OOB to chair past shift: 1    Cardiac:   Cardiac Monitoring: Yes           Access:  Current line(s): PIV     Genitourinary:   Urinary status: voiding    Respiratory:      Chronic home O2 use?: NO       Current diet:  ADULT DIET; Regular  Passing flatus: YES  Tolerating current diet: YES       Pain Management:   Patient states pain is manageable on current regimen: YES    Skin:     Interventions: increase time out of bed, PT/OT consult, limit briefs, internal/external urinary devices, and nutritional support    Patient Safety:  Fall Score:    Interventions: bed/chair alarm, assistive device (walker, cane. etc), gripper socks, and pt to call before getting OOB       Length of Stay:  Expected LOS: 4  Actual LOS: 3      Krys Foley RN

## 2024-09-27 NOTE — PROGRESS NOTES
Hospitalist Progress Note    NAME:   Talita Smalls   : 1955   MRN: [id number]     Date/Time: 2024 10:54 PM  Patient PCP: Sid Mark MD    Estimated discharge date:  Barriers:       Assessment / Plan:    Ground level fall  Presyncopal episode  HTN  The blood pressure is on the low side, hold losartan and hydrochlorothiazide, monitor for now possibly be side effect of anesthesia     Hx of bilateral ICA stenosis  Sickle cell trait  DM  HLD  Hold januvia, added low dose SSI           Medical Decision Making:   I personally reviewed labs:  I personally reviewed imaging:  I personally reviewed EKG:  Toxic drug monitoring:   Discussed case with:         Code Status:   DVT Prophylaxis:   GI Prophylaxis:    Subjective:     Chief Complaint / Reason for Physician Visit    Discussed with RN events overnight.       Objective:     VITALS:   Last 24hrs VS reviewed since prior progress note. Most recent are:  Patient Vitals for the past 24 hrs:   BP Temp Temp src Pulse Resp SpO2   24 117/67 99 °F (37.2 °C) Oral 79 16 97 %   24 1857 -- -- -- -- 18 --   24 1700 -- -- -- -- 18 --   24 1630 -- -- -- -- 18 --   24 1607 (!) 105/51 98.1 °F (36.7 °C) Oral 67 18 100 %   24 1127 (!) 143/47 97.5 °F (36.4 °C) -- 70 18 --   24 0959 (!) 123/52 -- -- 58 -- --   24 0755 (!) 118/52 97.9 °F (36.6 °C) Oral 60 18 99 %   24 0325 112/61 97.9 °F (36.6 °C) Oral 62 16 100 %   24 2345 (!) 103/54 97.5 °F (36.4 °C) Oral 74 16 99 %         Intake/Output Summary (Last 24 hours) at 2024 2254  Last data filed at 2024 1838  Gross per 24 hour   Intake 480 ml   Output 950 ml   Net -470 ml        I had a face to face encounter and independently examined this patient on 2024, as outlined below:  PHYSICAL EXAM:  General: Alert, cooperative  EENT:  EOMI. Anicteric sclerae.  Resp:  CTA bilaterally, no wheezing or rales.  No accessory muscle use  CV:  Regular

## 2024-09-27 NOTE — PLAN OF CARE
Problem: Physical Therapy - Adult  Goal: By Discharge: Performs mobility at highest level of function for planned discharge setting.  See evaluation for individualized goals.  Description: FUNCTIONAL STATUS PRIOR TO ADMISSION: Independent w/ ambulation and ADLs. Denies history of falls other than fall that occurred just prior to admission. Works part-time.     HOME SUPPORT PRIOR TO ADMISSION: The patient lived alone however has supportive daughter who plans to stay with her indefinitely.     Physical Therapy Goals  Initiated 9/26/2024  1.  Patient will move from supine to sit and sit to supine, scoot up and down, and roll side to side in bed with supervision/set-up within 7 day(s).    2.  Patient will transfer from bed to chair and chair to bed via lateral transfer and/or sliding board transfer with supervision/set-up using the least restrictive device within 7 day(s).  Outcome: Progressing   PHYSICAL THERAPY TREATMENT    Patient: Talita Smalls (68 y.o. female)  Date: 9/27/2024  Diagnosis: Closed fracture of both ankles, initial encounter [S82.891A, S82.892A]  Bilateral ankle fractures, closed, initial encounter [S82.891A, S82.892A] Bilateral ankle fractures, closed, initial encounter  Procedure(s) (LRB):  BILATERAL ANKLE OPEN REDUCTION INTERNAL FIXATION (Bilateral) 2 Days Post-Op  Precautions: Weight Bearing Right Lower Extremity Weight Bearing: Toe Touch Weight Bearing                    ASSESSMENT:  Patient continues to benefit from skilled PT services and is progressing towards goals. Pt received semi fowlers in bed, agreeable to participate in therapy. Pt demonstrating continued improvements with mobility this date. Good adherence to WB precautions with each task. Performed slide board transfer to multiple heights with no limitations however required assist for slide board placement. Good tolerance to W/C training, required up to Min A for cueing and additional time for mobility, particularly for turns. Pt

## 2024-09-27 NOTE — PLAN OF CARE
Problem: Occupational Therapy - Adult  Goal: By Discharge: Performs self-care activities at highest level of function for planned discharge setting.  See evaluation for individualized goals.  Description: FUNCTIONAL STATUS PRIOR TO ADMISSION:  Patient was ambulatory using no DME, reporting she was Independent with ADLs/IADLs.   , ADL Assistance: Independent,  ,  ,  ,  ,  , Homemaking Assistance: Independent,  ,  ,       HOME SUPPORT: Patient lived alone; however, daughter plans to stay with pt upon D/C home.    Occupational Therapy Goals:  Initiated 9/26/2024  1.  Patient will perform W/C level grooming with Modified [place] within 7 day(s).  2.  Patient will perform EOB lower body dressing with Set-up within 7 day(s).  3.  Patient will perform EOB bathing with Set-up within 7 day(s).  4.  Patient will perform toilet transfers, lateral transfer to/from drop arm BSC, with Modified [place]  within 7 day(s).  5.  Patient will perform all aspects of BSC toileting with Modified [place] within 7 day(s).  6.  Patient will participate in upper extremity therapeutic exercise/activities with Modified [place] for 10 minutes within 7 day(s).        Outcome: Progressing    OCCUPATIONAL THERAPY TREATMENT  Patient: Talita Smalls (68 y.o. female)  Date: 9/27/2024  Primary Diagnosis: Closed fracture of both ankles, initial encounter [S82.891A, S82.892A]  Bilateral ankle fractures, closed, initial encounter [S82.891A, S82.892A]  Procedure(s) (LRB):  BILATERAL ANKLE OPEN REDUCTION INTERNAL FIXATION (Bilateral) 2 Days Post-Op   Precautions: Weight Bearing Right Lower Extremity Weight Bearing: Toe Touch Weight Bearing Left Lower Extremity Weight Bearing:  (LLE WBAT for transfers only)            Chart, occupational therapy assessment, plan of care, and goals were reviewed.    ASSESSMENT  Patient continues to benefit from skilled OT services and is progressing towards goals. Pt rec'd seated at armchair and

## 2024-09-27 NOTE — PLAN OF CARE
Problem: Pain  Goal: Verbalizes/displays adequate comfort level or baseline comfort level  9/27/2024 0114 by Farhat Vitale RN  Outcome: Progressing  Flowsheets (Taken 9/27/2024 0114)  Verbalizes/displays adequate comfort level or baseline comfort level:   Encourage patient to monitor pain and request assistance   Assess pain using appropriate pain scale   Administer analgesics based on type and severity of pain and evaluate response  9/26/2024 1704 by Krys Foley RN  Outcome: Progressing     Problem: Safety - Adult  Goal: Free from fall injury  9/27/2024 0114 by Farhat Vitale, RN  Outcome: Progressing  9/26/2024 1704 by Krys Foley RN  Outcome: Progressing     Problem: Skin/Tissue Integrity  Goal: Absence of new skin breakdown  Description: 1.  Monitor for areas of redness and/or skin breakdown  2.  Assess vascular access sites hourly  3.  Every 4-6 hours minimum:  Change oxygen saturation probe site  4.  Every 4-6 hours:  If on nasal continuous positive airway pressure, respiratory therapy assess nares and determine need for appliance change or resting period.  9/27/2024 0114 by Farhat Vitale RN  Outcome: Progressing  9/26/2024 1704 by Krys Foley RN  Outcome: Progressing     Problem: Occupational Therapy - Adult  Goal: By Discharge: Performs self-care activities at highest level of function for planned discharge setting.  See evaluation for individualized goals.  Description: FUNCTIONAL STATUS PRIOR TO ADMISSION:  Patient was ambulatory using no DME, reporting she was Independent with ADLs/IADLs.   , ADL Assistance: Independent,  ,  ,  ,  ,  , Homemaking Assistance: Independent,  ,  ,       HOME SUPPORT: Patient lived alone; however, daughter plans to stay with pt upon D/C home.    Occupational Therapy Goals:  Initiated 9/26/2024  1.  Patient will perform W/C level grooming with Modified [place] within 7 day(s).  2.  Patient will perform EOB lower body dressing with Set-up within 7 day(s).  3.  Patient will

## 2024-09-27 NOTE — PROGRESS NOTES
ORTHO - Progress Note  Post Op day: 2 Days Post-Op    Talita Smalls     [id number]  female    68 y.o.    1955    Admit date:2024  Date of Surgery:  Procedures:Procedure(s):  BILATERAL ANKLE OPEN REDUCTION INTERNAL FIXATION  Surgeon:Surgeons and Role:   * Naveen Sandoval MD - Primary        SUBJECTIVE:     Talita Smalls is a 68 y.o. female resting in the bed.  Patient has complaints of appropriate post-op pain, tolerating PO pain medications OXY      OBJECTIVE:       Physical Exam:  General: alert, cooperative, no distress.   Gastrointestinal:  non-distended .    Cardiovascular:   Brisk cap refill in all distal extremities   Genitourinary: Voiding independently   Respiratory: No respiratory distress   Neurological:Neurovascular exam within normal limits.     Senstion intact: LE bilat.    Motor: + FHL/EHL.   Musculoskeletal: prox calves soft, supple, non-tender upon palpation .       Vital Signs:       Patient Vitals for the past 8 hrs:   BP Temp Temp src Pulse Resp SpO2   24 1838 -- -- -- -- 18 --   24 1629 (!) 122/56 98.8 °F (37.1 °C) Oral 74 17 97 %   24 1450 -- -- -- -- 18 --   24 1145 104/75 98.4 °F (36.9 °C) Oral 79 18 99 %   24 1105 -- -- -- -- 18 --                                          Temp (24hrs), Av.6 °F (37 °C), Min:98.1 °F (36.7 °C), Max:99 °F (37.2 °C)    Date 24 0000 - 24 2359   Shift 3800-2097 3240-1904 0225-6411 24 Hour Total   INTAKE   P.O.  240  240   Shift Total(mL/kg)  240(3.5)  240(3.5)   OUTPUT   Urine(mL/kg/hr) 600(1.1)  750 1350   Shift Total(mL/kg) 600(8.8)  750(11) 1350(19.9)   Weight (kg) 68 68 68 68     Labs:        Recent Labs     24  0548   HCT 34.8*   HGB 11.3*     PT/OT:              ASSESSMENT / PLAN:   Principal Problem:    Bilateral ankle fractures, closed, initial encounter  Resolved Problems:    * No resolved hospital problems. *     -  Garrett blood work in the AM  -  BM meds held --- continue to hold

## 2024-09-27 NOTE — CARE COORDINATION
Transition of Care Plan:     RUR: 13% low risk  Prior Level of Functioning: independent  Disposition: home with [facility]/OT/nursing/ care aide; DME   If SNF or IPR: Date FOC offered: n/a  Date FOC received:   Accepting facility:   Date authorization started with reference number:   Date authorization received and expires:   Follow up appointments: PCP and specialist as recommended  DME needed: 18\" wheelchair with bilateral elevating leg rests and swivel arm rests, bedside commode, transfer bench, sliding/ transfer board  Transportation at discharge: BLS stretcher needed  IM/IMM Medicare/ letter given: provided  Is patient a [place] and connected with [facility]? N/a              If yes, was  transfer form completed and VA notified?   Caregiver Contact: daughter Marbella Smalls [phone]  Discharge Caregiver contacted prior to discharge? CM will contact if pt requests  Care Conference needed? no  Barriers to discharge: DME delivery    3:52 p.m. CM reviewed 2IM notice with pt and placed signed copy on chart.     CM sent message to [employer] to determine when equipment will be delivered.     Shannen Perry LMSW  Supervisee in Social Work  Care Management, [facility]  [phone]

## 2024-09-28 LAB
ALBUMIN SERPL-MCNC: 2.9 G/DL (ref 3.5–5)
ALBUMIN/GLOB SERPL: 0.8 (ref 1.1–2.2)
ALP SERPL-CCNC: 78 U/L (ref 45–117)
ALT SERPL-CCNC: 24 U/L (ref 12–78)
ANION GAP SERPL CALC-SCNC: 6 MMOL/L (ref 2–12)
AST SERPL-CCNC: 28 U/L (ref 15–37)
BASOPHILS # BLD: 0 K/UL (ref 0–0.1)
BASOPHILS NFR BLD: 0 % (ref 0–1)
BILIRUB SERPL-MCNC: 0.6 MG/DL (ref 0.2–1)
BUN SERPL-MCNC: 15 MG/DL (ref 6–20)
BUN/CREAT SERPL: 15 (ref 12–20)
CALCIUM SERPL-MCNC: 8.6 MG/DL (ref 8.5–10.1)
CHLORIDE SERPL-SCNC: 107 MMOL/L (ref 97–108)
CO2 SERPL-SCNC: 24 MMOL/L (ref 21–32)
CREAT SERPL-MCNC: 1 MG/DL (ref 0.55–1.02)
DIFFERENTIAL METHOD BLD: ABNORMAL
EOSINOPHIL # BLD: 0.1 K/UL (ref 0–0.4)
EOSINOPHIL NFR BLD: 1 % (ref 0–7)
ERYTHROCYTE [DISTWIDTH] IN BLOOD BY AUTOMATED COUNT: 14.1 % (ref 11.5–14.5)
GLOBULIN SER CALC-MCNC: 3.6 G/DL (ref 2–4)
GLUCOSE BLD STRIP.AUTO-MCNC: 106 MG/DL (ref 65–117)
GLUCOSE BLD STRIP.AUTO-MCNC: 113 MG/DL (ref 65–117)
GLUCOSE BLD STRIP.AUTO-MCNC: 131 MG/DL (ref 65–117)
GLUCOSE BLD STRIP.AUTO-MCNC: 146 MG/DL (ref 65–117)
GLUCOSE SERPL-MCNC: 152 MG/DL (ref 65–100)
HCT VFR BLD AUTO: 31.8 % (ref 35–47)
HGB BLD-MCNC: 10.4 G/DL (ref 11.5–16)
IMM GRANULOCYTES # BLD AUTO: 0.1 K/UL (ref 0–0.04)
IMM GRANULOCYTES NFR BLD AUTO: 0 % (ref 0–0.5)
LYMPHOCYTES # BLD: 1.7 K/UL (ref 0.8–3.5)
LYMPHOCYTES NFR BLD: 13 % (ref 12–49)
MAGNESIUM SERPL-MCNC: 1.9 MG/DL (ref 1.6–2.4)
MCH RBC QN AUTO: 25.2 PG (ref 26–34)
MCHC RBC AUTO-ENTMCNC: 32.7 G/DL (ref 30–36.5)
MCV RBC AUTO: 77 FL (ref 80–99)
MONOCYTES # BLD: 0.9 K/UL (ref 0–1)
MONOCYTES NFR BLD: 7 % (ref 5–13)
NEUTS SEG # BLD: 10.6 K/UL (ref 1.8–8)
NEUTS SEG NFR BLD: 79 % (ref 32–75)
NRBC # BLD: 0 K/UL (ref 0–0.01)
NRBC BLD-RTO: 0 PER 100 WBC
PLATELET # BLD AUTO: 256 K/UL (ref 150–400)
PMV BLD AUTO: 10.7 FL (ref 8.9–12.9)
POTASSIUM SERPL-SCNC: 3.8 MMOL/L (ref 3.5–5.1)
PROT SERPL-MCNC: 6.5 G/DL (ref 6.4–8.2)
RBC # BLD AUTO: 4.13 M/UL (ref 3.8–5.2)
SERVICE CMNT-IMP: ABNORMAL
SERVICE CMNT-IMP: ABNORMAL
SERVICE CMNT-IMP: NORMAL
SERVICE CMNT-IMP: NORMAL
SODIUM SERPL-SCNC: 137 MMOL/L (ref 136–145)
WBC # BLD AUTO: 13.4 K/UL (ref 3.6–11)

## 2024-09-28 PROCEDURE — 6370000000 HC RX 637 (ALT 250 FOR IP): Performed by: ORTHOPAEDIC SURGERY

## 2024-09-28 PROCEDURE — 85025 COMPLETE CBC W/AUTO DIFF WBC: CPT

## 2024-09-28 PROCEDURE — 82962 GLUCOSE BLOOD TEST: CPT

## 2024-09-28 PROCEDURE — 36415 COLL VENOUS BLD VENIPUNCTURE: CPT

## 2024-09-28 PROCEDURE — 83735 ASSAY OF MAGNESIUM: CPT

## 2024-09-28 PROCEDURE — 6360000002 HC RX W HCPCS: Performed by: ORTHOPAEDIC SURGERY

## 2024-09-28 PROCEDURE — 80053 COMPREHEN METABOLIC PANEL: CPT

## 2024-09-28 PROCEDURE — 6360000002 HC RX W HCPCS: Performed by: PHYSICIAN ASSISTANT

## 2024-09-28 PROCEDURE — 1100000000 HC RM PRIVATE

## 2024-09-28 PROCEDURE — 2580000003 HC RX 258: Performed by: ORTHOPAEDIC SURGERY

## 2024-09-28 PROCEDURE — APPNB15 APP NON BILLABLE TIME 0-15 MINS: Performed by: PHYSICIAN ASSISTANT

## 2024-09-28 RX ADMIN — SODIUM CHLORIDE, PRESERVATIVE FREE 10 ML: 5 INJECTION INTRAVENOUS at 21:18

## 2024-09-28 RX ADMIN — ATORVASTATIN CALCIUM 20 MG: 20 TABLET, FILM COATED ORAL at 21:17

## 2024-09-28 RX ADMIN — FAMOTIDINE 20 MG: 20 TABLET, FILM COATED ORAL at 08:48

## 2024-09-28 RX ADMIN — ACETAMINOPHEN 500 MG: 500 TABLET ORAL at 08:48

## 2024-09-28 RX ADMIN — OXYCODONE 5 MG: 5 TABLET ORAL at 16:45

## 2024-09-28 RX ADMIN — SODIUM CHLORIDE, PRESERVATIVE FREE 5 ML: 5 INJECTION INTRAVENOUS at 08:49

## 2024-09-28 RX ADMIN — ACETAMINOPHEN 500 MG: 500 TABLET ORAL at 16:43

## 2024-09-28 RX ADMIN — OXYCODONE 5 MG: 5 TABLET ORAL at 21:18

## 2024-09-28 RX ADMIN — POTASSIUM CHLORIDE 10 MEQ: 750 TABLET, FILM COATED, EXTENDED RELEASE ORAL at 08:48

## 2024-09-28 RX ADMIN — ACETAMINOPHEN 500 MG: 500 TABLET ORAL at 21:18

## 2024-09-28 RX ADMIN — ENOXAPARIN SODIUM 40 MG: 100 INJECTION SUBCUTANEOUS at 08:47

## 2024-09-28 RX ADMIN — OXYCODONE 5 MG: 5 TABLET ORAL at 11:16

## 2024-09-28 RX ADMIN — OXYCODONE 5 MG: 5 TABLET ORAL at 05:00

## 2024-09-28 RX ADMIN — ONDANSETRON 4 MG: 2 INJECTION INTRAMUSCULAR; INTRAVENOUS at 04:19

## 2024-09-28 RX ADMIN — ACETAMINOPHEN 500 MG: 500 TABLET ORAL at 04:14

## 2024-09-28 ASSESSMENT — PAIN DESCRIPTION - DESCRIPTORS
DESCRIPTORS: SORE
DESCRIPTORS: ACHING
DESCRIPTORS: ACHING

## 2024-09-28 ASSESSMENT — PAIN DESCRIPTION - LOCATION
LOCATION: ANKLE

## 2024-09-28 ASSESSMENT — PAIN SCALES - GENERAL
PAINLEVEL_OUTOF10: 0
PAINLEVEL_OUTOF10: 5
PAINLEVEL_OUTOF10: 2
PAINLEVEL_OUTOF10: 5
PAINLEVEL_OUTOF10: 3

## 2024-09-28 ASSESSMENT — PAIN DESCRIPTION - ORIENTATION
ORIENTATION: RIGHT
ORIENTATION: RIGHT
ORIENTATION: RIGHT;LEFT

## 2024-09-28 ASSESSMENT — PAIN DESCRIPTION - PAIN TYPE: TYPE: SURGICAL PAIN

## 2024-09-28 ASSESSMENT — PAIN DESCRIPTION - FREQUENCY: FREQUENCY: CONTINUOUS

## 2024-09-28 ASSESSMENT — PAIN DESCRIPTION - ONSET: ONSET: ON-GOING

## 2024-09-28 NOTE — PROGRESS NOTES
Ortho:    LLE with post op dressing and ACE-- CDI    CAM boot fitted on the left ankle- to be worn when out of bed  WB on the LLE for tranfers ONLY  Ok to remove CAM boot when in bed  I reviewed with the pt and her daughter at bedside proper fitment     Left calf soft, non-tender  Intact EHL/FHL. Plant/dorsiflexion      Right short leg splint CDI  Continue NWB on the RLE    Johnathon Gustafson PA-C

## 2024-09-28 NOTE — PROGRESS NOTES
Hospitalist Progress Note    NAME:   Talita Smalls   : 1955   MRN: [id number]     Date/Time: 2024 7:27 PM  Patient PCP: Sid Mark MD    Estimated discharge date:  Barriers:       Assessment / Plan:    Ground level fall  Presyncopal episode  HTN  The blood pressure is normalized, continue to hold losartan and hydrochlorothiazide, monitor for now possibly be side effect of anesthesia    Constipation  - Started on lactulose responded well     Hx of bilateral ICA stenosis  Sickle cell trait  DM  HLD  Hold januvia, added low dose SSI           Medical Decision Making:   I personally reviewed labs:  I personally reviewed imaging:  I personally reviewed EKG:  Toxic drug monitoring:   Discussed case with:         Code Status:   DVT Prophylaxis:   GI Prophylaxis:    Subjective:     Chief Complaint / Reason for Physician Visit    Discussed with RN events overnight.       Objective:     VITALS:   Last 24hrs VS reviewed since prior progress note. Most recent are:  Patient Vitals for the past 24 hrs:   BP Temp Temp src Pulse Resp SpO2   24 1645 -- -- -- -- 16 --   24 1548 123/67 98.8 °F (37.1 °C) -- 92 16 98 %   24 1126 (!) 124/57 99.9 °F (37.7 °C) -- 75 16 98 %   24 1116 -- -- -- -- 16 --   24 0830 (!) 117/47 98.8 °F (37.1 °C) Oral 90 16 95 %   24 0530 -- -- -- -- 16 --   24 0415 133/60 99.1 °F (37.3 °C) Oral 87 17 95 %   24 2351 -- -- -- -- 16 --   24 2316 (!) 108/44 98.6 °F (37 °C) Oral 86 16 --   24 2315 (!) 112/38 98.6 °F (37 °C) -- 79 -- 98 %   24 (!) 128/51 98.2 °F (36.8 °C) Oral 81 17 97 %       No intake or output data in the 24 hours ending 24       I had a face to face encounter and independently examined this patient on 2024, as outlined below:  PHYSICAL EXAM:  General: Alert, cooperative  EENT:  EOMI. Anicteric sclerae.  Resp:  CTA bilaterally, no wheezing or rales.  No accessory muscle

## 2024-09-28 NOTE — PROGRESS NOTES
ORTHO - Progress Note  Post Op day: 3 Days Post-Op    Talita Smalls     [id number]  female    68 y.o.    1955    Admit date:2024  Date of Surgery:  Procedures:Procedure(s):  BILATERAL ANKLE OPEN REDUCTION INTERNAL FIXATION  Surgeon:Surgeons and Role:   * Naveen Sandoval MD - Primary        SUBJECTIVE:     Talita Smalls is a 68 y.o. female resting in the bed.  Pain well controlled with current pain regimen.  Daughter is on her way to the hospital.        OBJECTIVE:       Physical Exam:  General: alert, cooperative, no distress.   Gastrointestinal:  non-distended .    Cardiovascular:   Brisk cap refill in all distal extremities   Genitourinary: Voiding independently   Respiratory: No respiratory distress   Neurological:Neurovascular exam within normal limits.     Senstion intact: LE bilat.    Motor: + FHL/EHL. Able to actively flex and extend the ankle on the left.    Musculoskeletal: prox calves soft, supple, non-tender upon palpation .       Vital Signs:       Patient Vitals for the past 8 hrs:   BP Temp Temp src Pulse Resp SpO2   24 0830 (!) 117/47 98.8 °F (37.1 °C) Oral 90 16 95 %   24 0530 -- -- -- -- 16 --   24 0415 133/60 99.1 °F (37.3 °C) Oral 87 17 95 %                                          Temp (24hrs), Av.6 °F (37 °C), Min:98.2 °F (36.8 °C), Max:99.1 °F (37.3 °C)        Labs:        Recent Labs     24  0434   HCT 31.8*   HGB 10.4*     PT/OT:              ASSESSMENT / PLAN:   Principal Problem:    Bilateral ankle fractures, closed, initial encounter  Resolved Problems:    * No resolved hospital problems. *     -  BM meds held --- continue to hold   -  CAM boot to LLE-- WB for transfer ONLY-- boot does not have to be worn in bed  -  Continue PT/OT NWB on the LE bilat  -  DVT prophylaxis- SCD w/  Lovenox 40QD  -  DC planning - Home w/ HH/PT     Signed By: Clark Moran MD

## 2024-09-28 NOTE — PLAN OF CARE
Problem: Pain  Goal: Verbalizes/displays adequate comfort level or baseline comfort level  9/28/2024 1029 by Cara Carvajal, RN  Outcome: Progressing  9/27/2024 2156 by Linnea Quinteros RN  Outcome: Progressing     Problem: Safety - Adult  Goal: Free from fall injury  9/28/2024 1029 by Cara Carvajal, RN  Outcome: Progressing  Flowsheets (Taken 9/28/2024 0846)  Free From Fall Injury: Instruct family/caregiver on patient safety  9/27/2024 2156 by Linnea Quinteros RN  Outcome: Progressing     Problem: Skin/Tissue Integrity  Goal: Absence of new skin breakdown  Description: 1.  Monitor for areas of redness and/or skin breakdown  2.  Assess vascular access sites hourly  3.  Every 4-6 hours minimum:  Change oxygen saturation probe site  4.  Every 4-6 hours:  If on nasal continuous positive airway pressure, respiratory therapy assess nares and determine need for appliance change or resting period.  9/28/2024 1029 by Cara Carvajal, RN  Outcome: Progressing  9/27/2024 2156 by Linnea Quinteros RN  Outcome: Progressing

## 2024-09-28 NOTE — PROGRESS NOTES
Hospitalist Progress Note    NAME:   Talita Smalls   : 1955   MRN: [id number]     Date/Time: 2024 10:36 PM  Patient PCP: Sid Mark MD    Estimated discharge date:  Barriers:       Assessment / Plan:    Ground level fall  Presyncopal episode  HTN  The blood pressure is normalized, hold losartan and hydrochlorothiazide, monitor for now possibly be side effect of anesthesia    Constipation  - Proceed with lactulose     Hx of bilateral ICA stenosis  Sickle cell trait  DM  HLD  Hold januvia, added low dose SSI           Medical Decision Making:   I personally reviewed labs:  I personally reviewed imaging:  I personally reviewed EKG:  Toxic drug monitoring:   Discussed case with:         Code Status:   DVT Prophylaxis:   GI Prophylaxis:    Subjective:     Chief Complaint / Reason for Physician Visit    Discussed with RN events overnight.       Objective:     VITALS:   Last 24hrs VS reviewed since prior progress note. Most recent are:  Patient Vitals for the past 24 hrs:   BP Temp Temp src Pulse Resp SpO2   24 (!) 128/51 98.2 °F (36.8 °C) Oral 81 17 97 %   24 1838 -- -- -- -- 18 --   24 1629 (!) 122/56 98.8 °F (37.1 °C) Oral 74 17 97 %   24 1450 -- -- -- -- 18 --   24 1145 104/75 98.4 °F (36.9 °C) Oral 79 18 99 %   24 1105 -- -- -- -- 18 --   24 1035 -- -- -- -- 18 --   24 0948 -- -- -- -- 18 --   24 0919 (!) 102/50 98.1 °F (36.7 °C) Oral 75 18 98 %   24 0918 -- -- -- -- 18 --   24 0556 -- -- -- -- 18 --   24 0520 (!) 108/49 98.4 °F (36.9 °C) Oral 62 -- 100 %   24 0046 -- -- -- -- 16 --   24 0009 (!) 96/55 98.8 °F (37.1 °C) Oral 74 16 95 %         Intake/Output Summary (Last 24 hours) at 20246  Last data filed at 2024 1841  Gross per 24 hour   Intake 240 ml   Output 1350 ml   Net -1110 ml        I had a face to face encounter and independently examined this patient on 2024, as outlined

## 2024-09-28 NOTE — CARE COORDINATION
Transition of Care Plan:     RUR: 13% low risk  Prior Level of Functioning: independent  Disposition: home with [facility]/OT/nursing/ care aide; DME   If SNF or IPR: Date FOC offered: n/a  Date FOC received:   Accepting facility:   Date authorization started with reference number:   Date authorization received and expires:   Follow up appointments: PCP and specialist as recommended  DME needed: 18\" wheelchair with bilateral elevating leg rests and swivel arm rests, bedside commode, transfer bench, sliding/ transfer board  Transportation at discharge: BLS stretcher needed  IM/IMM Medicare/ letter given: provided  Is patient a [place] and connected with VA? N/a              If yes, was  transfer form completed and VA notified?   Caregiver Contact: daughter Marbella Smalls [phone]  Discharge Caregiver contacted prior to discharge? CM will contact if pt requests  Care Conference needed? no  Barriers to discharge:           CM informed by pt's nurse that DME company was requesting pt's secondary insurance and when pt called [employer] back she was advised they had left for the day.  CM will send a message in [employer]  2IMM notice provided yesterday.  [facility] will inform [facility] when d/c order is entered.     Shannen Perry LMSW  Supervisee in Social Work  Care Management, [facility]  [phone]

## 2024-09-29 VITALS
SYSTOLIC BLOOD PRESSURE: 131 MMHG | BODY MASS INDEX: 28.3 KG/M2 | WEIGHT: 149.91 LBS | RESPIRATION RATE: 18 BRPM | HEART RATE: 73 BPM | OXYGEN SATURATION: 96 % | HEIGHT: 61 IN | DIASTOLIC BLOOD PRESSURE: 57 MMHG | TEMPERATURE: 99 F

## 2024-09-29 LAB
GLUCOSE BLD STRIP.AUTO-MCNC: 105 MG/DL (ref 65–117)
GLUCOSE BLD STRIP.AUTO-MCNC: 109 MG/DL (ref 65–117)
SERVICE CMNT-IMP: NORMAL
SERVICE CMNT-IMP: NORMAL

## 2024-09-29 PROCEDURE — APPNB15 APP NON BILLABLE TIME 0-15 MINS: Performed by: PHYSICIAN ASSISTANT

## 2024-09-29 PROCEDURE — 6370000000 HC RX 637 (ALT 250 FOR IP): Performed by: ORTHOPAEDIC SURGERY

## 2024-09-29 PROCEDURE — 6360000002 HC RX W HCPCS: Performed by: PHYSICIAN ASSISTANT

## 2024-09-29 PROCEDURE — 82962 GLUCOSE BLOOD TEST: CPT

## 2024-09-29 PROCEDURE — 2580000003 HC RX 258: Performed by: ORTHOPAEDIC SURGERY

## 2024-09-29 RX ORDER — ENOXAPARIN SODIUM 100 MG/ML
40 INJECTION SUBCUTANEOUS DAILY
Qty: 28 EACH | Refills: 0 | Status: SHIPPED | OUTPATIENT
Start: 2024-09-30

## 2024-09-29 RX ADMIN — OXYCODONE 5 MG: 5 TABLET ORAL at 01:45

## 2024-09-29 RX ADMIN — OXYCODONE 5 MG: 5 TABLET ORAL at 07:42

## 2024-09-29 RX ADMIN — FAMOTIDINE 20 MG: 20 TABLET, FILM COATED ORAL at 07:42

## 2024-09-29 RX ADMIN — SODIUM CHLORIDE, PRESERVATIVE FREE 10 ML: 5 INJECTION INTRAVENOUS at 07:43

## 2024-09-29 RX ADMIN — ACETAMINOPHEN 500 MG: 500 TABLET ORAL at 07:42

## 2024-09-29 RX ADMIN — OXYCODONE 5 MG: 5 TABLET ORAL at 12:38

## 2024-09-29 RX ADMIN — POTASSIUM CHLORIDE 10 MEQ: 750 TABLET, FILM COATED, EXTENDED RELEASE ORAL at 07:42

## 2024-09-29 RX ADMIN — ENOXAPARIN SODIUM 40 MG: 100 INJECTION SUBCUTANEOUS at 07:42

## 2024-09-29 ASSESSMENT — PAIN DESCRIPTION - ORIENTATION
ORIENTATION: RIGHT;LEFT
ORIENTATION: RIGHT;LEFT

## 2024-09-29 ASSESSMENT — PAIN SCALES - GENERAL
PAINLEVEL_OUTOF10: 3
PAINLEVEL_OUTOF10: 5
PAINLEVEL_OUTOF10: 3
PAINLEVEL_OUTOF10: 6
PAINLEVEL_OUTOF10: 6
PAINLEVEL_OUTOF10: 3

## 2024-09-29 ASSESSMENT — PAIN DESCRIPTION - DESCRIPTORS
DESCRIPTORS: ACHING
DESCRIPTORS: ACHING

## 2024-09-29 ASSESSMENT — PAIN DESCRIPTION - LOCATION
LOCATION: ANKLE
LOCATION: ANKLE

## 2024-09-29 NOTE — PLAN OF CARE
Problem: Safety - Adult  Goal: Free from fall injury  9/28/2024 2225 by Linnea Quinteros, RN  Outcome: Progressing     Problem: Pain  Goal: Verbalizes/displays adequate comfort level or baseline comfort level  9/28/2024 2225 by Linnea Quinteros, RN  Outcome: Progressing     Problem: Skin/Tissue Integrity  Goal: Absence of new skin breakdown  Description: 1.  Monitor for areas of redness and/or skin breakdown  2.  Assess vascular access sites hourly  3.  Every 4-6 hours minimum:  Change oxygen saturation probe site  4.  Every 4-6 hours:  If on nasal continuous positive airway pressure, respiratory therapy assess nares and determine need for appliance change or resting period.  9/28/2024 2225 by Linnea Quinteros, RN  Outcome: Progressing

## 2024-09-29 NOTE — CARE COORDINATION
Home with HH.   [facility] @ 1500.     Transition of Care Plan:    RUR: 10% (low RUR)   Prior Level of Functioning: Independent  Disposition: Home with [facility] - PT/OT/SN/Hha SOC 24-48 hours after d/c   If SNF or IPR: Date FOC offered:   Date FOC received:   Accepting facility:   Date authorization started with reference number:   Date authorization received and expires:   Follow up appointments: PCP/Specialist as indicated  DME needed: 18\" wheelchair with bilateral elevating leg rests and swivel arm rests, bedside commode, transfer bench, sliding/ transfer board   Transportation at discharge:  [facility] @ [facility]  IM/IMM Medicare/ letter given: Last given 09/27  Is patient a  and connected with VA? N/a   If yes, was  transfer form completed and VA notified? N/a  Caregiver Contact: daughter Marbella Smalls [phone]   Discharge Caregiver contacted prior to discharge? Pt to contact  Care Conference needed? Not at this time  Barriers to discharge: None    0853 - Weekend CM completed chart review. Plan for home with [facility]. DME requests sent to [facility]. They are having trouble contacting patient to confirm payment and schedule delivery. CM provided pt's room phone as an alternate number.     MARIO updated.     1017 - Received call from ortho provider. Planning to d/c today, family has been able to secure temporary DME. Requesting stretcher transport home, CM requested [facility] for 1500, daughter will be home to receive.     CM provided DME company with daughter's information as well so they can coordinate scheduling DME delivery. CM added DME agency info to AVS.     Clear from  for d/c.        09/29/24 1023   Services At/After Discharge   Transition of Care Consult (CM Consult) Home Health;Transportation Assistance   Services At/After Discharge Home Health;DME;PT;OT;Nursing services    Resource Information Provided? No  (N/a)   Mode of Transport at Discharge BLS  ([facility])   Confirm

## 2024-09-29 NOTE — PROGRESS NOTES
ORTHO - Progress Note  Post Op day: 4 Days Post-Op    Talita Smalls     [id number]  female    68 y.o.    1955    Admit date:2024  Date of Surgery:   Procedures:Procedure(s):  BILATERAL ANKLE OPEN REDUCTION INTERNAL FIXATION  Surgeon:Surgeons and Role: Naveen Sandoval MD - Primary        SUBJECTIVE:     Talita Smalls is a 68 y.o. female resting in the bed, chatting on the phone.  Patient has complaints of appropriate post-op pain, tolerating PO pain medications OXY  Denies F/C, nausea, vomiting, dizziness, lightheadedness, chest pain, or shortness of breath.    OBJECTIVE:       Physical Exam:  General: alert, cooperative, no distress.   Gastrointestinal:  non-distended .    Cardiovascular:  Brisk cap refill in all distal extremities   Genitourinary: Voiding independently   Respiratory: No respiratory distress   Neurological:Neurovascular exam within normal limits.     Senstion intact: LE bilat.    Motor: + DF/PF/EHL.   Musculoskeletal:  Calves soft, supple, non-tender upon palpation   Dressing/Wound:  Clean, dry and intact. No significant erythema or swelling.    Vital Signs:       Patient Vitals for the past 8 hrs:   BP Temp Temp src Pulse Resp SpO2   24 0730 121/62 98.1 °F (36.7 °C) Oral 84 18 95 %   24 0536 (!) 122/59 97.9 °F (36.6 °C) -- 70 20 96 %                                          Temp (24hrs), Av.5 °F (36.9 °C), Min:97.9 °F (36.6 °C), Max:99.9 °F (37.7 °C)      Labs:        Recent Labs     24  0434   HCT 31.8*   HGB 10.4*     PT/OT:              ASSESSMENT / PLAN:   Principal Problem:    Bilateral ankle fractures, closed, initial encounter  Resolved Problems:    * No resolved hospital problems. *     -  BP remains nml despite holding valsartan  -  Continue PT/OT NWB on the RLE---   -  CAM boot on the LLE when out of bed/for transfers ONLY  -  DVT prophylaxis- SCD w/ Lovenox  -  DC planning -  Home w/ HH/PT---TODAY --- pt has [employer] equipment in place--- additional

## (undated) DEVICE — C-ARMOR C-ARM EQUIPMENT COVERS CLEAR STERILE UNIVERSAL FIT 12 PER CASE: Brand: C-ARMOR

## (undated) DEVICE — DRESSING PETRO W3XL8IN OIL EMUL N ADH GZ KNIT IMPREG CELOS

## (undated) DEVICE — DRESSING STERILE PETRO W3XL8IN N ADH OIL EMUL GZ CURAD

## (undated) DEVICE — DRILL BIT, AO DIA2.0MM X 135MM, SCALED: Brand: VARIAX

## (undated) DEVICE — PADDING CAST W6INXL4YD NONSTERILE COT RAYON MICROPLEATED

## (undated) DEVICE — BANDAGE COBAN 4 IN COMPR W4INXL5YD FOAM COHESIVE QUIK STK SELF ADH SFT

## (undated) DEVICE — SNAP KOVER: Brand: UNBRANDED

## (undated) DEVICE — 4-PORT MANIFOLD: Brand: NEPTUNE 2

## (undated) DEVICE — UNTHREADED GUIDE WIRE: Brand: FIXOS

## (undated) DEVICE — APPLICATOR MEDICATED 26 CC SOLUTION HI LT ORNG CHLORAPREP

## (undated) DEVICE — Z DISCONTINUED SOLUTION ANTISEP 4OZ 70% ALC ISO 1ST AID

## (undated) DEVICE — SUTURE VICRYL SZ 0 L36IN ABSRB UD L36MM CT-1 1/2 CIR J946H

## (undated) DEVICE — SOLUTION IRRIG 1000ML STRL H2O USP PLAS POUR BTL

## (undated) DEVICE — KIRSCHNER WIRE WITH STOP WITH MM STOP: Brand: VARIAX

## (undated) DEVICE — OVERDRILL AO, DIA2.7MM X 122MM: Brand: VARIAX

## (undated) DEVICE — DRAPE,ORTHOMAX,EXTREMITY: Brand: MEDLINE

## (undated) DEVICE — GLOVE ORTHO 8   MSG9480

## (undated) DEVICE — SUTURE MONOCRYL SZ 2-0 L36IN ABSRB UD L36MM CT-1 1/2 CIR Y945H

## (undated) DEVICE — PADDING CAST W6INXL4YD COT LO LINTING WYTEX

## (undated) DEVICE — DRILL BIT, AO DIA2.6MM X 135MM, SCALED: Brand: VARIAX

## (undated) DEVICE — SUTURE ETHILON SZ 2-0 L30IN NONABSORBABLE BLK L36MM PSLX 3/8 1697H

## (undated) DEVICE — GOWN,SIRUS,NONRNF,SETINSLV,2XL,18/CS: Brand: MEDLINE

## (undated) DEVICE — SPLINT ORTH W5XL30IN PLSTR OF PARIS LO EXOTHERM SMOOTH

## (undated) DEVICE — CANNULATED DRILL

## (undated) DEVICE — 60-7070-105 TRNQT,DPSB,PLC BLUE: Brand: MEDLINE RENEWAL

## (undated) DEVICE — EXTREMITY-MRMC: Brand: MEDLINE INDUSTRIES, INC.

## (undated) DEVICE — GLOVE SURG SZ 85 L12IN FNGR THK79MIL GRN LTX FREE

## (undated) DEVICE — CANNULATED SCREW
Type: IMPLANTABLE DEVICE | Site: ANKLE | Status: NON-FUNCTIONAL
Brand: ASNIS
Removed: 2024-09-25

## (undated) DEVICE — BANDAGE COMPR W6INXL5YD WHT BGE POLY COT M E WRP WV HK AND

## (undated) DEVICE — TUBING SUCT 12FR MAL ALUM SHFT FN CAP VENT UNIV CONN W/ OBT

## (undated) DEVICE — SOLUTION IRRIG 1000ML 0.9% SOD CHL USP POUR PLAS BTL